# Patient Record
Sex: FEMALE | Race: WHITE | NOT HISPANIC OR LATINO | Employment: FULL TIME | URBAN - METROPOLITAN AREA
[De-identification: names, ages, dates, MRNs, and addresses within clinical notes are randomized per-mention and may not be internally consistent; named-entity substitution may affect disease eponyms.]

---

## 2018-04-30 ENCOUNTER — APPOINTMENT (EMERGENCY)
Dept: RADIOLOGY | Facility: HOSPITAL | Age: 23
DRG: 866 | End: 2018-04-30
Payer: COMMERCIAL

## 2018-04-30 ENCOUNTER — HOSPITAL ENCOUNTER (INPATIENT)
Facility: HOSPITAL | Age: 23
LOS: 2 days | Discharge: HOME | DRG: 866 | End: 2018-05-02
Attending: EMERGENCY MEDICINE | Admitting: FAMILY MEDICINE
Payer: COMMERCIAL

## 2018-04-30 DIAGNOSIS — R79.89 ELEVATED LFTS: ICD-10-CM

## 2018-04-30 DIAGNOSIS — R11.2 NAUSEA & VOMITING: ICD-10-CM

## 2018-04-30 DIAGNOSIS — R16.0 HEPATOMEGALY: ICD-10-CM

## 2018-04-30 DIAGNOSIS — K29.70 GASTRITIS: Primary | ICD-10-CM

## 2018-04-30 DIAGNOSIS — R10.13 ACUTE EPIGASTRIC PAIN: ICD-10-CM

## 2018-04-30 PROBLEM — D72.829 LEUKOCYTOSIS: Status: ACTIVE | Noted: 2018-04-30

## 2018-04-30 PROBLEM — N39.0 UTI (URINARY TRACT INFECTION): Status: ACTIVE | Noted: 2018-04-30

## 2018-04-30 LAB
ALBUMIN SERPL BCP-MCNC: 3.4 G/DL (ref 3.5–5)
ALP SERPL-CCNC: 223 U/L (ref 46–116)
ALT SERPL W P-5'-P-CCNC: 288 U/L (ref 12–78)
AMYLASE SERPL-CCNC: 95 IU/L (ref 25–115)
ANION GAP SERPL CALCULATED.3IONS-SCNC: 10 MMOL/L (ref 4–13)
AST SERPL W P-5'-P-CCNC: 215 U/L (ref 5–45)
BACTERIA UR QL AUTO: ABNORMAL /HPF
BASOPHILS # BLD AUTO: 0 THOUSANDS/ΜL (ref 0–0.1)
BASOPHILS NFR BLD AUTO: 0 % (ref 0–1)
BILIRUB SERPL-MCNC: 1.9 MG/DL (ref 0.2–1)
BILIRUB UR QL STRIP: ABNORMAL
BUN SERPL-MCNC: 9 MG/DL (ref 5–25)
CALCIUM SERPL-MCNC: 9.2 MG/DL (ref 8.3–10.1)
CHLORIDE SERPL-SCNC: 102 MMOL/L (ref 100–108)
CLARITY UR: CLEAR
CO2 SERPL-SCNC: 27 MMOL/L (ref 21–32)
COLOR UR: ABNORMAL
CREAT SERPL-MCNC: 1.07 MG/DL (ref 0.6–1.3)
EOSINOPHIL # BLD AUTO: 0 THOUSAND/ΜL (ref 0–0.61)
EOSINOPHIL NFR BLD AUTO: 0 % (ref 0–6)
ERYTHROCYTE [DISTWIDTH] IN BLOOD BY AUTOMATED COUNT: 14.8 % (ref 11.6–15.1)
EXT PREG TEST URINE: NEGATIVE
GFR SERPL CREATININE-BSD FRML MDRD: 73 ML/MIN/1.73SQ M
GLUCOSE SERPL-MCNC: 94 MG/DL (ref 65–140)
GLUCOSE UR STRIP-MCNC: NEGATIVE MG/DL
HCT VFR BLD AUTO: 39.5 % (ref 37–47)
HGB BLD-MCNC: 12.9 G/DL (ref 12–16)
HGB UR QL STRIP.AUTO: ABNORMAL
KETONES UR STRIP-MCNC: ABNORMAL MG/DL
LEUKOCYTE ESTERASE UR QL STRIP: ABNORMAL
LIPASE SERPL-CCNC: 293 U/L (ref 73–393)
LYMPHOCYTES # BLD AUTO: 7.7 THOUSANDS/ΜL (ref 0.6–4.47)
LYMPHOCYTES NFR BLD AUTO: 69 % (ref 14–44)
MAGNESIUM SERPL-MCNC: 2.1 MG/DL (ref 1.6–2.6)
MCH RBC QN AUTO: 29.2 PG (ref 27–31)
MCHC RBC AUTO-ENTMCNC: 32.6 G/DL (ref 31.4–37.4)
MCV RBC AUTO: 90 FL (ref 82–98)
MONOCYTES # BLD AUTO: 1.3 THOUSAND/ΜL (ref 0.17–1.22)
MONOCYTES NFR BLD AUTO: 11 % (ref 4–12)
MUCOUS THREADS UR QL AUTO: ABNORMAL
NEUTROPHILS # BLD AUTO: 2.2 THOUSANDS/ΜL (ref 1.85–7.62)
NEUTS SEG NFR BLD AUTO: 19 % (ref 43–75)
NITRITE UR QL STRIP: POSITIVE
NON-SQ EPI CELLS URNS QL MICRO: ABNORMAL /HPF
NRBC BLD AUTO-RTO: 0 /100 WBCS
PH UR STRIP.AUTO: 6.5 [PH] (ref 5–9)
PLATELET # BLD AUTO: 197 THOUSANDS/UL (ref 130–400)
PLATELET BLD QL SMEAR: ADEQUATE
PMV BLD AUTO: 8.1 FL (ref 8.9–12.7)
POTASSIUM SERPL-SCNC: 3.9 MMOL/L (ref 3.5–5.3)
PROT SERPL-MCNC: 7.7 G/DL (ref 6.4–8.2)
PROT UR STRIP-MCNC: ABNORMAL MG/DL
RBC # BLD AUTO: 4.41 MILLION/UL (ref 4.2–5.4)
RBC #/AREA URNS AUTO: ABNORMAL /HPF
SMUDGE CELLS BLD QL SMEAR: PRESENT
SODIUM SERPL-SCNC: 139 MMOL/L (ref 136–145)
SP GR UR STRIP.AUTO: 1.02 (ref 1–1.03)
UROBILINOGEN UR QL STRIP.AUTO: 2 E.U./DL
WBC # BLD AUTO: 11.2 THOUSAND/UL (ref 4.8–10.8)
WBC #/AREA URNS AUTO: ABNORMAL /HPF

## 2018-04-30 PROCEDURE — 74177 CT ABD & PELVIS W/CONTRAST: CPT

## 2018-04-30 PROCEDURE — 81025 URINE PREGNANCY TEST: CPT | Performed by: EMERGENCY MEDICINE

## 2018-04-30 PROCEDURE — 81001 URINALYSIS AUTO W/SCOPE: CPT | Performed by: EMERGENCY MEDICINE

## 2018-04-30 PROCEDURE — 36415 COLL VENOUS BLD VENIPUNCTURE: CPT | Performed by: EMERGENCY MEDICINE

## 2018-04-30 PROCEDURE — 82150 ASSAY OF AMYLASE: CPT | Performed by: EMERGENCY MEDICINE

## 2018-04-30 PROCEDURE — 85025 COMPLETE CBC W/AUTO DIFF WBC: CPT | Performed by: EMERGENCY MEDICINE

## 2018-04-30 PROCEDURE — 99285 EMERGENCY DEPT VISIT HI MDM: CPT

## 2018-04-30 PROCEDURE — 87081 CULTURE SCREEN ONLY: CPT | Performed by: NURSE PRACTITIONER

## 2018-04-30 PROCEDURE — 87086 URINE CULTURE/COLONY COUNT: CPT | Performed by: NURSE PRACTITIONER

## 2018-04-30 PROCEDURE — 96361 HYDRATE IV INFUSION ADD-ON: CPT

## 2018-04-30 PROCEDURE — 96375 TX/PRO/DX INJ NEW DRUG ADDON: CPT

## 2018-04-30 PROCEDURE — 99222 1ST HOSP IP/OBS MODERATE 55: CPT | Performed by: NURSE PRACTITIONER

## 2018-04-30 PROCEDURE — 83735 ASSAY OF MAGNESIUM: CPT | Performed by: NURSE PRACTITIONER

## 2018-04-30 PROCEDURE — 96365 THER/PROPH/DIAG IV INF INIT: CPT

## 2018-04-30 PROCEDURE — 83690 ASSAY OF LIPASE: CPT | Performed by: EMERGENCY MEDICINE

## 2018-04-30 PROCEDURE — 80053 COMPREHEN METABOLIC PANEL: CPT | Performed by: EMERGENCY MEDICINE

## 2018-04-30 RX ORDER — ONDANSETRON 2 MG/ML
4 INJECTION INTRAMUSCULAR; INTRAVENOUS ONCE
Status: COMPLETED | OUTPATIENT
Start: 2018-04-30 | End: 2018-04-30

## 2018-04-30 RX ORDER — SODIUM CHLORIDE, SODIUM LACTATE, POTASSIUM CHLORIDE, CALCIUM CHLORIDE 600; 310; 30; 20 MG/100ML; MG/100ML; MG/100ML; MG/100ML
125 INJECTION, SOLUTION INTRAVENOUS CONTINUOUS
Status: DISCONTINUED | OUTPATIENT
Start: 2018-04-30 | End: 2018-05-02 | Stop reason: HOSPADM

## 2018-04-30 RX ORDER — MAGNESIUM HYDROXIDE/ALUMINUM HYDROXICE/SIMETHICONE 120; 1200; 1200 MG/30ML; MG/30ML; MG/30ML
30 SUSPENSION ORAL ONCE
Status: COMPLETED | OUTPATIENT
Start: 2018-04-30 | End: 2018-04-30

## 2018-04-30 RX ORDER — ALPRAZOLAM 0.25 MG/1
0.25 TABLET ORAL
COMMUNITY

## 2018-04-30 RX ORDER — KETOROLAC TROMETHAMINE 30 MG/ML
15 INJECTION, SOLUTION INTRAMUSCULAR; INTRAVENOUS EVERY 6 HOURS PRN
Status: DISCONTINUED | OUTPATIENT
Start: 2018-04-30 | End: 2018-05-01

## 2018-04-30 RX ORDER — NORGESTIMATE AND ETHINYL ESTRADIOL 7DAYSX3 LO
1 KIT ORAL DAILY
COMMUNITY

## 2018-04-30 RX ORDER — PANTOPRAZOLE SODIUM 40 MG/1
40 TABLET, DELAYED RELEASE ORAL
Status: DISCONTINUED | OUTPATIENT
Start: 2018-04-30 | End: 2018-04-30

## 2018-04-30 RX ORDER — NORGESTIMATE AND ETHINYL ESTRADIOL 7DAYSX3 LO
1 KIT ORAL DAILY
Status: DISCONTINUED | OUTPATIENT
Start: 2018-05-01 | End: 2018-05-02 | Stop reason: HOSPADM

## 2018-04-30 RX ORDER — PANTOPRAZOLE SODIUM 40 MG/1
40 TABLET, DELAYED RELEASE ORAL
Status: DISCONTINUED | OUTPATIENT
Start: 2018-05-01 | End: 2018-05-01

## 2018-04-30 RX ORDER — KETOROLAC TROMETHAMINE 30 MG/ML
30 INJECTION, SOLUTION INTRAMUSCULAR; INTRAVENOUS ONCE
Status: COMPLETED | OUTPATIENT
Start: 2018-04-30 | End: 2018-04-30

## 2018-04-30 RX ORDER — BUTALBITAL, ACETAMINOPHEN AND CAFFEINE 50; 325; 40 MG/1; MG/1; MG/1
1 TABLET ORAL EVERY 6 HOURS PRN
Status: DISCONTINUED | OUTPATIENT
Start: 2018-04-30 | End: 2018-04-30

## 2018-04-30 RX ORDER — ALPRAZOLAM 0.25 MG/1
0.25 TABLET ORAL
Status: DISCONTINUED | OUTPATIENT
Start: 2018-04-30 | End: 2018-05-02 | Stop reason: HOSPADM

## 2018-04-30 RX ADMIN — ONDANSETRON 4 MG: 2 INJECTION INTRAMUSCULAR; INTRAVENOUS at 17:04

## 2018-04-30 RX ADMIN — IOHEXOL 100 ML: 350 INJECTION, SOLUTION INTRAVENOUS at 18:38

## 2018-04-30 RX ADMIN — FAMOTIDINE 20 MG: 10 INJECTION, SOLUTION INTRAVENOUS at 17:15

## 2018-04-30 RX ADMIN — SODIUM CHLORIDE 1000 ML: 0.9 INJECTION, SOLUTION INTRAVENOUS at 17:04

## 2018-04-30 RX ADMIN — KETOROLAC TROMETHAMINE 30 MG: 30 INJECTION, SOLUTION INTRAMUSCULAR at 17:14

## 2018-04-30 RX ADMIN — SODIUM CHLORIDE, SODIUM LACTATE, POTASSIUM CHLORIDE, AND CALCIUM CHLORIDE 125 ML/HR: .6; .31; .03; .02 INJECTION, SOLUTION INTRAVENOUS at 22:21

## 2018-04-30 RX ADMIN — ALUMINUM HYDROXIDE, MAGNESIUM HYDROXIDE, AND SIMETHICONE 30 ML: 200; 200; 20 SUSPENSION ORAL at 17:14

## 2018-04-30 RX ADMIN — CEFTRIAXONE 1000 MG: 1 INJECTION, SOLUTION INTRAVENOUS at 18:48

## 2018-04-30 NOTE — ED PROVIDER NOTES
History  Chief Complaint   Patient presents with    Abdominal Pain     had fever on Tuesday and Wednesday started with vomiting and abd pain on Thursday   Saw PMD on Thursday neg flu but treated with Tamaflu  Then on Saturday spoke with  a doctor via phone who then prescribed levaquin and Flagyl     STARTED W/ FVR 5 DAYS AGO, PMD GAVE TAMIFLU, FLU WAS NEG???  THEN PMD GAVE LEVAQN AND FLGYL??  C/O EPIGAST PAIN, N/V  TENDER EPIGAST AREA  History provided by:  Patient  Abdominal Pain   Pain location:  Epigastric  Pain quality: dull and heavy    Pain radiates to:  Does not radiate  Pain severity:  Moderate  Onset quality:  Unable to specify  Duration:  5 days  Timing:  Constant  Progression:  Worsening  Chronicity:  New  Relieved by:  Nothing  Worsened by:  Nothing  Associated symptoms: nausea and vomiting        Prior to Admission Medications   Prescriptions Last Dose Informant Patient Reported? Taking? ALPRAZolam (XANAX) 0 25 mg tablet Past Week at Unknown time  Yes Yes   Sig: Take 0 25 mg by mouth daily at bedtime as needed for anxiety      Facility-Administered Medications: None       Past Medical History:   Diagnosis Date    Anxiety     Migraine        Past Surgical History:   Procedure Laterality Date    KNEE ARTHROSCOPY         History reviewed  No pertinent family history  I have reviewed and agree with the history as documented  Social History   Substance Use Topics    Smoking status: Never Smoker    Smokeless tobacco: Never Used    Alcohol use Yes      Comment: rare        Review of Systems   Gastrointestinal: Positive for abdominal pain, nausea and vomiting  All other systems reviewed and are negative        Physical Exam  ED Triage Vitals [04/30/18 1620]   Temperature Pulse Respirations Blood Pressure SpO2   99 1 °F (37 3 °C) 78 18 104/55 100 %      Temp Source Heart Rate Source Patient Position - Orthostatic VS BP Location FiO2 (%)   Tympanic Monitor Sitting Right arm --      Pain Score       Worst Possible Pain           Orthostatic Vital Signs  Vitals:    04/30/18 1745 04/30/18 1800 04/30/18 1815 04/30/18 1830   BP: 106/57 106/60 100/60 101/67   Pulse: 66 64 60 60   Patient Position - Orthostatic VS:           Physical Exam   Constitutional: She is oriented to person, place, and time  She appears well-developed and well-nourished  No distress  HENT:   Head: Normocephalic and atraumatic  Eyes: Conjunctivae and EOM are normal    Neck: Normal range of motion  Neck supple  Cardiovascular: Normal rate and regular rhythm  Pulmonary/Chest: Effort normal and breath sounds normal  She exhibits no tenderness  Abdominal: Soft  There is tenderness in the epigastric area  Musculoskeletal: Normal range of motion  Neurological: She is alert and oriented to person, place, and time  Skin: Skin is warm and dry  No rash noted  She is not diaphoretic         ED Medications  Medications   cefTRIAXone (ROCEPHIN) IVPB (premix) 1,000 mg (1,000 mg Intravenous New Bag 4/30/18 1848)   ketorolac (TORADOL) injection 30 mg (30 mg Intravenous Given 4/30/18 1714)   aluminum-magnesium hydroxide-simethicone (MYLANTA) 200-200-20 mg/5 mL oral suspension 30 mL (30 mL Oral Given 4/30/18 1714)   famotidine (PEPCID) injection 20 mg (20 mg Intravenous Given 4/30/18 1715)   ondansetron (ZOFRAN) injection 4 mg (4 mg Intravenous Given 4/30/18 1704)   sodium chloride 0 9 % bolus 1,000 mL (1,000 mL Intravenous New Bag 4/30/18 1704)   iohexol (OMNIPAQUE) 350 MG/ML injection (MULTI-DOSE) 100 mL (100 mL Intravenous Given 4/30/18 1838)       Diagnostic Studies  Results Reviewed     Procedure Component Value Units Date/Time    CBC and differential [[id number]]  (Abnormal) Collected:  04/30/18 1703    Lab Status:  Final result Specimen:  Blood from Arm, Right Updated:  04/30/18 1746     WBC 11 20 (H) Thousand/uL      RBC 4 41 Million/uL      Hemoglobin 12 9 g/dL      Hematocrit 39 5 %      MCV 90 fL      MCH 29 2 pg      MCHC 32 6 g/dL      RDW 14 8 %      MPV 8 1 (L) fL      Platelets 467 Thousands/uL      nRBC 0 /100 WBCs      Neutrophils Relative 19 (L) %      Lymphocytes Relative 69 (H) %      Monocytes Relative 11 %      Eosinophils Relative 0 %      Basophils Relative 0 %      Neutrophils Absolute 2 20 Thousands/µL      Lymphocytes Absolute 7 70 (H) Thousands/µL      Monocytes Absolute 1 30 (H) Thousand/µL      Eosinophils Absolute 0 00 Thousand/µL      Basophils Absolute 0 00 Thousands/µL     Urine Microscopic [[id number]]  (Abnormal) Collected:  04/30/18 1712    Lab Status:  Final result Specimen:  Urine from Urine, Clean Catch Updated:  04/30/18 1738     RBC, UA 1-2 (A) /hpf      WBC, UA None Seen /hpf      Epithelial Cells Occasional /hpf      Bacteria, UA Moderate (A) /hpf      MUCOUS THREADS Moderate (A)    Comprehensive metabolic panel [[id number]]  (Abnormal) Collected:  04/30/18 1703    Lab Status:  Final result Specimen:  Blood from Arm, Right Updated:  04/30/18 1735     Sodium 139 mmol/L      Potassium 3 9 mmol/L      Chloride 102 mmol/L      CO2 27 mmol/L      Anion Gap 10 mmol/L      BUN 9 mg/dL      Creatinine 1 07 mg/dL      Glucose 94 mg/dL      Calcium 9 2 mg/dL       (H) U/L       (H) U/L      Alkaline Phosphatase 223 (H) U/L      Total Protein 7 7 g/dL      Albumin 3 4 (L) g/dL      Total Bilirubin 1 90 (H) mg/dL      eGFR 73 ml/min/1 73sq m     Narrative:         National Kidney Disease Education Program recommendations are as follows:  GFR calculation is accurate only with a steady state creatinine  Chronic Kidney disease less than 60 ml/min/1 73 sq  meters  Kidney failure less than 15 ml/min/1 73 sq  meters      Amylase [[id number]]  (Normal) Collected:  04/30/18 1703    Lab Status:  Final result Specimen:  Blood from Arm, Right Updated:  04/30/18 1735     Amylase 95 IU/L     Lipase [[id number]]  (Normal) Collected:  04/30/18 1703    Lab Status:  Final result Specimen:  Blood from Arm, Right Updated: 04/30/18 1735     Lipase 293 u/L     Urinalysis with reflex to microscopic [[id number]]  (Abnormal) Collected:  04/30/18 1712    Lab Status:  Final result Specimen:  Urine from Urine, Clean Catch Updated:  04/30/18 1725     Color, UA Sameera     Clarity, UA Clear     Specific Gravity, UA 1 025     pH, UA 6 5     Leukocytes, UA Trace (A)     Nitrite, UA Positive (A)     Protein, UA 30 (1+) (A) mg/dl      Glucose, UA Negative mg/dl      Ketones, UA 15 (1+) (A) mg/dl      Urobilinogen, UA 2 0 (A) E U /dl      Bilirubin, UA Interference- unable to analyze (A)     Blood, UA Trace-lysed (A)    POCT pregnancy, urine [[id number]]  (Normal) Resulted:  04/30/18 1710    Lab Status:  Final result Updated:  04/30/18 1715     EXT PREG TEST UR (Ref: Negative) negative                 CT abdomen pelvis with contrast   Final Result by Uriah Aviles MD (04/30 1854)      Hepatomegaly  Mild diffuse gastric wall thickening could relate to gastritis or underdistention  Trace bilateral pleural effusions  Workstation performed: [id number]                    Procedures  Procedures       Phone Contacts  ED Phone Contact    ED Course                               MDM  Number of Diagnoses or Management Options  Diagnosis management comments: PT WILL NEED GI CONSULT, HEPATITIS W/UP    CritCare Time    Disposition  Final diagnoses:   Gastritis   Hepatomegaly     Time reflects when diagnosis was documented in both MDM as applicable and the Disposition within this note     Time User Action Codes Description Comment    4/30/2018  7:19 PM Ruddy Elam Add [K29 70] Gastritis     4/30/2018  7:20 PM Ruddy Elam Add [R16 0] Hepatomegaly       ED Disposition     ED Disposition Condition Comment    Admit  Case was discussed with HOSPITALIST and the patient's admission status was agreed to be Admission Status: inpatient status to the service of Dr Alba Capellan           Follow-up Information    None       Patient's Medications   Discharge Prescriptions    No medications on file     No discharge procedures on file      ED Provider  Electronically Signed by           Billie Mckoy MD  04/30/18 9658

## 2018-04-30 NOTE — LETTER
[address] [address] [place] [address]  Dept: [phone]    May 2, 2018     Patient: Cedrick Chavis   YOB: 1995   Date of Visit: 4/30/2018       To Whom it May Concern:    Cedrick Chavis is under my professional care  She was seen in the hospital from 4/30/2018   to 05/02/18  She may return to work on 05/07/18  If you have any questions or concerns, please don't hesitate to call           Sincerely,          Sarwat Winston MD

## 2018-05-01 ENCOUNTER — APPOINTMENT (INPATIENT)
Dept: RADIOLOGY | Facility: HOSPITAL | Age: 23
DRG: 866 | End: 2018-05-01
Payer: COMMERCIAL

## 2018-05-01 LAB
ALBUMIN SERPL BCP-MCNC: 2.8 G/DL (ref 3.5–5)
ALP SERPL-CCNC: 199 U/L (ref 46–116)
ALT SERPL W P-5'-P-CCNC: 246 U/L (ref 12–78)
AMPHETAMINES SERPL QL SCN: NEGATIVE
ANION GAP SERPL CALCULATED.3IONS-SCNC: 13 MMOL/L (ref 4–13)
APAP SERPL-MCNC: <2 UG/ML (ref 10–30)
AST SERPL W P-5'-P-CCNC: 196 U/L (ref 5–45)
BARBITURATES UR QL: POSITIVE
BASOPHILS # BLD AUTO: 0 THOUSANDS/ΜL (ref 0–0.1)
BASOPHILS NFR BLD AUTO: 0 % (ref 0–1)
BENZODIAZ UR QL: NEGATIVE
BILIRUB SERPL-MCNC: 2 MG/DL (ref 0.2–1)
BUN SERPL-MCNC: 11 MG/DL (ref 5–25)
CALCIUM SERPL-MCNC: 8.6 MG/DL (ref 8.3–10.1)
CHLORIDE SERPL-SCNC: 104 MMOL/L (ref 100–108)
CO2 SERPL-SCNC: 22 MMOL/L (ref 21–32)
COCAINE UR QL: NEGATIVE
CREAT SERPL-MCNC: 1.09 MG/DL (ref 0.6–1.3)
EOSINOPHIL # BLD AUTO: 0 THOUSAND/ΜL (ref 0–0.61)
EOSINOPHIL NFR BLD AUTO: 0 % (ref 0–6)
ERYTHROCYTE [DISTWIDTH] IN BLOOD BY AUTOMATED COUNT: 14.6 % (ref 11.6–15.1)
GFR SERPL CREATININE-BSD FRML MDRD: 71 ML/MIN/1.73SQ M
GLUCOSE SERPL-MCNC: 75 MG/DL (ref 65–140)
HAV IGM SER QL: NORMAL
HBV CORE IGM SER QL: NORMAL
HBV SURFACE AG SER QL: NORMAL
HCT VFR BLD AUTO: 36.9 % (ref 37–47)
HCV AB SER QL: NORMAL
HETEROPH AB SER QL: NEGATIVE
HGB BLD-MCNC: 11.9 G/DL (ref 12–16)
LACTATE SERPL-SCNC: 1.1 MMOL/L (ref 0.5–2)
LYMPHOCYTES # BLD AUTO: 7.5 THOUSANDS/ΜL (ref 0.6–4.47)
LYMPHOCYTES NFR BLD AUTO: 74 % (ref 14–44)
MAGNESIUM SERPL-MCNC: 2 MG/DL (ref 1.6–2.6)
MCH RBC QN AUTO: 29 PG (ref 27–31)
MCHC RBC AUTO-ENTMCNC: 32.3 G/DL (ref 31.4–37.4)
MCV RBC AUTO: 90 FL (ref 82–98)
METHADONE UR QL: NEGATIVE
MONOCYTES # BLD AUTO: 0.8 THOUSAND/ΜL (ref 0.17–1.22)
MONOCYTES NFR BLD AUTO: 8 % (ref 4–12)
NEUTROPHILS # BLD AUTO: 1.8 THOUSANDS/ΜL (ref 1.85–7.62)
NEUTS SEG NFR BLD AUTO: 18 % (ref 43–75)
NRBC BLD AUTO-RTO: 0 /100 WBCS
OPIATES UR QL SCN: POSITIVE
PCP UR QL: NEGATIVE
PLATELET # BLD AUTO: 174 THOUSANDS/UL (ref 130–400)
PMV BLD AUTO: 8.6 FL (ref 8.9–12.7)
POTASSIUM SERPL-SCNC: 4 MMOL/L (ref 3.5–5.3)
PROT SERPL-MCNC: 6.6 G/DL (ref 6.4–8.2)
RBC # BLD AUTO: 4.11 MILLION/UL (ref 4.2–5.4)
SALICYLATES SERPL-MCNC: <3 MG/DL (ref 3–20)
SMUDGE CELLS BLD QL SMEAR: PRESENT
SODIUM SERPL-SCNC: 139 MMOL/L (ref 136–145)
THC UR QL: NEGATIVE
WBC # BLD AUTO: 10.1 THOUSAND/UL (ref 4.8–10.8)

## 2018-05-01 PROCEDURE — 83605 ASSAY OF LACTIC ACID: CPT | Performed by: NURSE PRACTITIONER

## 2018-05-01 PROCEDURE — 86308 HETEROPHILE ANTIBODY SCREEN: CPT | Performed by: NURSE PRACTITIONER

## 2018-05-01 PROCEDURE — 80053 COMPREHEN METABOLIC PANEL: CPT | Performed by: NURSE PRACTITIONER

## 2018-05-01 PROCEDURE — 87040 BLOOD CULTURE FOR BACTERIA: CPT | Performed by: NURSE PRACTITIONER

## 2018-05-01 PROCEDURE — 80307 DRUG TEST PRSMV CHEM ANLYZR: CPT | Performed by: NURSE PRACTITIONER

## 2018-05-01 PROCEDURE — 76705 ECHO EXAM OF ABDOMEN: CPT

## 2018-05-01 PROCEDURE — 80074 ACUTE HEPATITIS PANEL: CPT | Performed by: NURSE PRACTITIONER

## 2018-05-01 PROCEDURE — 99254 IP/OBS CNSLTJ NEW/EST MOD 60: CPT | Performed by: INTERNAL MEDICINE

## 2018-05-01 PROCEDURE — G0480 DRUG TEST DEF 1-7 CLASSES: HCPCS | Performed by: NURSE PRACTITIONER

## 2018-05-01 PROCEDURE — 80329 ANALGESICS NON-OPIOID 1 OR 2: CPT | Performed by: NURSE PRACTITIONER

## 2018-05-01 PROCEDURE — 85025 COMPLETE CBC W/AUTO DIFF WBC: CPT | Performed by: NURSE PRACTITIONER

## 2018-05-01 PROCEDURE — 83735 ASSAY OF MAGNESIUM: CPT | Performed by: NURSE PRACTITIONER

## 2018-05-01 PROCEDURE — 99232 SBSQ HOSP IP/OBS MODERATE 35: CPT | Performed by: INTERNAL MEDICINE

## 2018-05-01 RX ORDER — CODEINE/BUTALBITAL/ASA/CAFFEIN 30-50-325
1 CAPSULE ORAL EVERY 6 HOURS PRN
COMMUNITY

## 2018-05-01 RX ORDER — PANTOPRAZOLE SODIUM 40 MG/1
40 TABLET, DELAYED RELEASE ORAL
Status: DISCONTINUED | OUTPATIENT
Start: 2018-05-01 | End: 2018-05-02 | Stop reason: HOSPADM

## 2018-05-01 RX ORDER — ONDANSETRON 2 MG/ML
4 INJECTION INTRAMUSCULAR; INTRAVENOUS EVERY 6 HOURS PRN
Status: DISCONTINUED | OUTPATIENT
Start: 2018-05-01 | End: 2018-05-02

## 2018-05-01 RX ORDER — IBUPROFEN 400 MG/1
400 TABLET ORAL EVERY 6 HOURS PRN
Status: DISCONTINUED | OUTPATIENT
Start: 2018-05-01 | End: 2018-05-02 | Stop reason: HOSPADM

## 2018-05-01 RX ORDER — BUTALBITAL/ASPIRIN/CAFFEINE 50-325-40
1 CAPSULE ORAL EVERY 4 HOURS PRN
Status: ON HOLD | COMMUNITY
End: 2018-05-01 | Stop reason: CLARIF

## 2018-05-01 RX ADMIN — SODIUM CHLORIDE, SODIUM LACTATE, POTASSIUM CHLORIDE, AND CALCIUM CHLORIDE 125 ML/HR: .6; .31; .03; .02 INJECTION, SOLUTION INTRAVENOUS at 05:27

## 2018-05-01 RX ADMIN — SODIUM CHLORIDE, SODIUM LACTATE, POTASSIUM CHLORIDE, AND CALCIUM CHLORIDE 125 ML/HR: .6; .31; .03; .02 INJECTION, SOLUTION INTRAVENOUS at 22:07

## 2018-05-01 RX ADMIN — PANTOPRAZOLE SODIUM 40 MG: 40 TABLET, DELAYED RELEASE ORAL at 05:27

## 2018-05-01 RX ADMIN — CEFTRIAXONE 1000 MG: 1 INJECTION, SOLUTION INTRAVENOUS at 19:15

## 2018-05-01 RX ADMIN — ONDANSETRON 4 MG: 2 INJECTION INTRAMUSCULAR; INTRAVENOUS at 22:17

## 2018-05-01 RX ADMIN — SODIUM CHLORIDE, SODIUM LACTATE, POTASSIUM CHLORIDE, AND CALCIUM CHLORIDE 125 ML/HR: .6; .31; .03; .02 INJECTION, SOLUTION INTRAVENOUS at 13:30

## 2018-05-01 RX ADMIN — IBUPROFEN 400 MG: 400 TABLET, FILM COATED ORAL at 22:08

## 2018-05-01 RX ADMIN — PANTOPRAZOLE SODIUM 40 MG: 40 TABLET, DELAYED RELEASE ORAL at 17:21

## 2018-05-01 NOTE — ASSESSMENT & PLAN NOTE
· Mild  WBC 11 2  Patient does not meet sepsis criteria  · Received iv Rocephin for UTI in ED  Will continue    Will repeat lab in AM

## 2018-05-01 NOTE — ASSESSMENT & PLAN NOTE
Patient also noted to have elevated transaminases, alkaline phosphatase and bilirubin  Right upper quadrant ultrasound showed thickened gallbladder wall without any evidence of cholecystitis  Cannot rule out viral etiology  Hepatitis panel was negative  Monitor LFTs in a m

## 2018-05-01 NOTE — ASSESSMENT & PLAN NOTE
· Patient takes Fioricet with codeine p r n  at home  Will hold due to elevated LFTs  · Toradol p r n  Xiomara Ped

## 2018-05-01 NOTE — ASSESSMENT & PLAN NOTE
· Persistent elevated AST ALT, alkaline phosphatase and bilirubin-cannot rule out viral etiology  · CT abdomen pelvis showed hepatomegaly, no gallstones or CBD stones    · Under sound showed thickened gallbladder wall without any evidence of cholecystitis

## 2018-05-01 NOTE — CONSULTS
Consultation - [facility]  Fannie Wilkins 24 y o  female MRN: [id number]  Unit/Bed#: [address] [place] [address] Encounter: [id number]        Inpatient consult to gastroenterology  Consult performed by: Kathie Mckinley ordered by: Sharmila Parker          Reason for Consult / Principal Problem: nausea, vomiting, elevated LFTs, epigastric pain    ASSESSMENT and PLAN:    Principal Problem:    Acute epigastric pain  Active Problems:    Hepatomegaly    UTI (urinary tract infection)    Elevated LFTs    Leukocytosis    Nausea & vomiting    Migraine    Anxiety    #1  Nausea, vomiting, and epigastric pain: started after taking tamiflu for flu like symptoms  Likely related to antibiotic use, underlying viral illness, and gastritis  PUD also on differential although less likely due to lack of NSAID use  No vomiting since yesterdat  -Advance diet to clears  Advacne from there as tolerated  -Antiemetics as needed  -Increase Protonix to BID dosing  -Cannot start carafate as she has cross reactivity to trazadone which causes her severe itching    -Would only recommend endoscopy in this patient if she had persistent symptoms despite supportive care and medications, likely as an outpatient if needed  -no antibiotics recommended at this time for her GI issues  It is noted that she is on Rocephin currently for an underlying UTI  #2  Elevated LFTs: unknown cause, possibly viral related  AST, ALT and alk phos improved today, T bili slightly worsened  , , alk-phos 199, T bili 2 0  Mono test is negative  Tamiflu does not typically cause elevated LFTs  Ultrasound is showing a possible sign of hepatitis due to decreased echogenicity of the liver related to the kidney  No signs of acute cholecystitis  Tylenol level normal  -follow-up hepatitis panel  -continue to monitor closely    Would only consider further workup if LFTs are not improving     -------------------------------------------------------------------------------------------------------------------    HPI:  This is a 48-year-old female with a history of anxiety and migraines who presented to the emergency room due to worsening nausea, vomiting, and epigastric pain  The patient reports that she started with flu-like symptoms, fever, chills, body aches last week  Her primary care physician started her on Tamiflu at that time  After taking the 1st dose of Tamiflu she began to have nausea and vomiting  She ultimately tested negative for the flu  Her family doctor then discontinue the Tamiflu and started her on Levaquin and Flagyl for the vomiting  The patient reports that the vomiting continued to progress  She has not vomited since yesterday at this time  She reports that she has some epigastric tenderness  She denies any NSAID use  She reports taking 1 dose of Tylenol extra-strength the other day  She also takes Fiorinal with codeine for migraines which she reports she took twice over the last week  She also takes Xanax as well for anxiety but has not taken that excessively recently  She denies any IV drug use or other herbal medications  She denies any personal or family history of GI issues or liver issues  She denies any excessive alcohol use  She has never had an upper endoscopy in the past   She denies any significant acid reflux symptoms  REVIEW OF SYSTEMS:    CONSTITUTIONAL: Denies any fever, chills, or rigors  Decreased appetite, and no recent weight loss  HEENT: No earache or tinnitus  Denies hearing loss or visual disturbances  CARDIOVASCULAR: No chest pain or palpitations  RESPIRATORY: Denies any cough, hemoptysis, shortness of breath or dyspnea on exertion  GASTROINTESTINAL: As noted in the History of Present Illness  GENITOURINARY:  +urinary urgency, Denies any hematuria or dysuria    NEUROLOGIC: No dizziness or vertigo, +migraine  MUSCULOSKELETAL: Denies any muscle or joint pain  SKIN: Denies skin rashes or itching  ENDOCRINE: Denies excessive thirst  Denies intolerance to heat or cold  PSYCHOSOCIAL: Denies depression or anxiety  Denies any recent memory loss  Historical Information   Past Medical History:   Diagnosis Date    Anxiety     Migraine      Past Surgical History:   Procedure Laterality Date    FRACTURE SURGERY      right ankle     KNEE ARTHROSCOPY      WISDOM TOOTH EXTRACTION Bilateral     uooers and lowers      Social History   History   Alcohol Use    Yes     Comment: rare     History   Drug Use No     History   Smoking Status    Never Smoker   Smokeless Tobacco    Never Used     History reviewed  No pertinent family history  Meds/Allergies     Prescriptions Prior to Admission   Medication    ALPRAZolam (XANAX) 0 25 mg tablet    butalbital-aspirin-caffeine-codeine (FIORINAL WITH CODEINE) -00-30 mg per capsule    norgestimate-ethinyl estradiol (ORTHO TRI-CYCLEN LO) 0 18/0 215/0 25 MG-25 MCG per tablet     Current Facility-Administered Medications   Medication Dose Route Frequency    ALPRAZolam (XANAX) tablet 0 25 mg  0 25 mg Oral HS PRN    cefTRIAXone (ROCEPHIN) IVPB (premix) 1,000 mg  1,000 mg Intravenous Q24H    ketorolac (TORADOL) injection 15 mg  15 mg Intravenous Q6H PRN    lactated ringers infusion  125 mL/hr Intravenous Continuous    norgestimate-ethinyl estradiol (ORTHO TRI-CYCLEN LO) 0 18/0 215/0 25 MG-25 MCG per tablet 1 tablet  1 tablet Oral Daily    pantoprazole (PROTONIX) EC tablet 40 mg  40 mg Oral BID AC       Allergies   Allergen Reactions    Trazamine [Trazodone & Diet Manage Prod]            Objective     Blood pressure 95/54, pulse 57, temperature 99 6 °F (37 6 °C), temperature source Tympanic, resp  rate 18, height 5' 3" (1 6 m), weight 55 3 kg (122 lb), last menstrual period 04/28/2018, SpO2 97 %, not currently breastfeeding        Intake/Output Summary (Last 24 hours) at 05/01/18 1120  Last data filed at 05/01/18 0601   Gross per 24 hour   Intake          2008  33 ml   Output                0 ml   Net          2008  33 ml         PHYSICAL EXAM:      General Appearance:   Alert, cooperative, no distress, appears stated age    HEENT:   Normocephalic, atraumatic, anicteric, no oropharyngeal thrush present      Neck:  Supple, symmetrical, trachea midline, no adenopathy;    thyroid: no enlargement/tenderness/nodules; no carotid  bruit or JVD    Lungs:   Clear to auscultation bilaterally; no rales, rhonchi or wheezing; respirations unlabored    Heart[de-identified]   S1 and S2 normal; regular rate and rhythm; no murmur, rub, or gallop  Abdomen:   Soft, epigastric tenderness to palpation, non-distended; normal bowel sounds; no masses, no organomegaly    Genitalia:   Deferred    Rectal:   Deferred    Extremities:  No cyanosis, clubbing or edema    Pulses:  2+ and symmetric all extremities    Skin:  Skin color, texture, turgor normal, no rashes or lesions    Lymph nodes:  No palpable cervical, axillary or inguinal lymphadenopathy        Lab Results:     Results from last 7 days  Lab Units 05/01/18  0612   WBC Thousand/uL 10 10   HEMOGLOBIN g/dL 11 9*   HEMATOCRIT % 36 9*   PLATELETS Thousands/uL 174   NEUTROS PCT % 18*   LYMPHS PCT % 74*   MONOS PCT % 8   EOS PCT % 0       Results from last 7 days  Lab Units 05/01/18  0612   SODIUM mmol/L 139   POTASSIUM mmol/L 4 0   CHLORIDE mmol/L 104   CO2 mmol/L 22   BUN mg/dL 11   CREATININE mg/dL 1 09   CALCIUM mg/dL 8 6   TOTAL PROTEIN g/dL 6 6   BILIRUBIN TOTAL mg/dL 2 00*   ALK PHOS U/L 199*   ALT U/L 246*   AST U/L 196*   GLUCOSE RANDOM mg/dL 75           Results from last 7 days  Lab Units 04/30/18  1703   LIPASE u/L 293   AMYLASE IU/L 95       Imaging Studies: I have personally reviewed pertinent imaging studies  Us Liver    Result Date: 5/1/2018  Impression: 1  Mild gallbladder wall thickening though this is likely secondary to underdistention    No stones or pericholecystic fluid identified  2   Relatively decreased echogenicity of the liver relative to the right kidney  Given normal renal function and elevated LFTs, this can be seen with hepatitis  Workstation performed: [id number]     Ct Abdomen Pelvis With Contrast    Result Date: 4/30/2018  Impression: Hepatomegaly  Mild diffuse gastric wall thickening could relate to gastritis or underdistention  Trace bilateral pleural effusions  Workstation performed: [id number]           Patient was seen and examined by Dr Sung Buck  All denise medical decisions were made by Dr Sung Buck  Thank you for allowing us to participate in the care of this present patient  We will follow-up with you closely

## 2018-05-01 NOTE — ASSESSMENT & PLAN NOTE
· CT abdomen pelvis with IV contrast showed mild diffuse gastric wall thickening could relate to gastritis or underdistention  Amylase and lipase normal   · Was on Levaquin/Flagyl p o  since Saturday for epigastric pain/nausea/vomiting  · NPO  IV fluids  Pain control  Protonix p o  Rosella Goldmann Hold off antibiotics  · Consult GI in a m Rosella Goldmann

## 2018-05-01 NOTE — ASSESSMENT & PLAN NOTE
· Multifactorial   Secondary to UTI, gastritis, 2/2 elevated liver enzymes  · CT abdomen pelvis showed hepatomegaly, mild diffuse gastric wall thickening could relate to gastritis or underdistention  · UA+ ketones  Will check Lactic acid  · NPO  Antiemetic p r n  IV fluids

## 2018-05-01 NOTE — ASSESSMENT & PLAN NOTE
· Patient takes Fioricet with codeine p r n  at home  Will hold due to elevated LFTs  · Toradol p r n  Venice Segundo

## 2018-05-01 NOTE — CASE MANAGEMENT
Continued Stay Review    Date: 5/1/18    Vital Signs: BP 95/54 (BP Location: Left arm)   Pulse 57   Temp 99 6 °F (37 6 °C) (Tympanic)   Resp 18   Ht 5' 3" (1 6 m)   Wt 55 3 kg (122 lb)   LMP 04/28/2018   SpO2 97%   Breastfeeding? No   BMI 21 61 kg/m²     Patient has a temp of 101 2 F, hospitalist notified, orders received  ACUTE LOC  CBC DIFF CMP   BLOOD CX   CONSULT GI  RAPID DRUG SCREEN   MONO SCREEN  Medications:   Scheduled Meds:   Current Facility-Administered Medications:  ALPRAZolam 0 25 mg Oral HS PRN KIM Mares    cefTRIAXone 1,000 mg Intravenous Q24H Ruddy Elam MD Last Rate: Stopped (04/30/18 1941)   ketorolac 15 mg Intravenous Q6H PRN KIM Mares    lactated ringers 125 mL/hr Intravenous Continuous IKM Mares Last Rate: 125 mL/hr (05/01/18 0527)   norgestimate-ethinyl estradiol 1 tablet Oral Daily KIM Mares    pantoprazole 40 mg Oral Early Morning KIM Mares      Continuous Infusions:   lactated ringers 125 mL/hr Last Rate: 125 mL/hr (05/01/18 0527)     PRN Meds:   ALPRAZolam    ketorolac    Abnormal Labs/Diagnostic Results:   Barbiturate Ur Negative Positive   A      Opiate Urine Negative Positive   A      RBC 4 20 - 5 40 Million/uL 4 11   L    Hemoglobin 12 0 - 16 0 g/dL 11 9   L    Hematocrit 37 0 - 47 0 % 36 9   L      AST 5 - 45 U/L 196   H      ALT 12 - 78 U/L 246   H      Alkaline Phosphatase 46 - 116 U/L 199   H      Albumin 3 5 - 5 0 g/dL 2 8   L    Total Bilirubin 0 20 - 1 00 mg/dL 2 85   H      Salicylate Lvl <3   L  3 - 20 mg/dL Final     Acetaminophen Level <2   L  10 - 30 ug/mL Final       Age/Sex: 24 y o  female     GI CONSULT     #1  Nausea, vomiting, and epigastric pain: started after taking tamiflu for flu like symptoms  Likely related to antibiotic use, underlying viral illness, and gastritis  PUD also on differential although less likely due to lack of NSAID use  No vomiting since yesterdat  -Advance diet to clears   Marixa Brick from there as tolerated  -Antiemetics as needed  -Increase Protonix to BID dosing  -Cannot start carafate as she has cross reactivity to trazadone which causes her severe itching    -Would only recommend endoscopy in this patient if she had persistent symptoms despite supportive care and medications, likely as an outpatient if needed  -no antibiotics recommended at this time for her GI issues  It is noted that she is on Rocephin currently for an underlying UTI      #2  Elevated LFTs: unknown cause, possibly viral related  AST, ALT and alk phos improved today, T bili slightly worsened  , , alk-phos 199, T bili 2 0  Mono test is negative  Tamiflu does not typically cause elevated LFTs  Ultrasound is showing a possible sign of hepatitis due to decreased echogenicity of the liver related to the kidney  No signs of acute cholecystitis  Tylenol level normal  -follow-up hepatitis panel  -continue to monitor closely  Would only consider further workup if LFTs are not improving  ATTENDING  Acute epigastric pain   Assessment & Plan     · CT abdomen pelvis with IV contrast showed mild diffuse gastric wall thickening could relate to gastritis or underdistention  Amylase and lipase normal   · Patient will be started on Protonix 40 milligram p o  b i d   · Patient could not get Carafate due to interaction with trazodone  · No plan for endoscopy unless refractory symptoms  · Patient started on clear liquid diet which can be advanced as tolerated         Hepatomegaly   Assessment & Plan     Patient also noted to have elevated transaminases, alkaline phosphatase and bilirubin  Right upper quadrant ultrasound showed thickened gallbladder wall without any evidence of cholecystitis  Cannot rule out viral etiology  Hepatitis panel was negative  Monitor LFTs in a m       UTI (urinary tract infection)   Assessment & Plan     · Patient is on IV Rocephin  · Follow up final urine culture     Elevated LFTs   Assessment & Plan     · Persistent elevated AST ALT, alkaline phosphatase and bilirubin-cannot rule out viral etiology     Nausea & vomiting   Assessment & Plan     Likely secondary to gastritis  Continue Protonix and clear liquid diet   Certification Statement: The patient will continue to require additional inpatient hospital stay due to Abdominal pain, elevated LFTs

## 2018-05-01 NOTE — PLAN OF CARE
GASTROINTESTINAL - ADULT     Minimal or absence of nausea and/or vomiting Progressing     Maintains adequate nutritional intake Progressing        INFECTION - ADULT     Absence or prevention of progression during hospitalization Progressing        PAIN - ADULT     Verbalizes/displays adequate comfort level or baseline comfort level Progressing

## 2018-05-01 NOTE — ASSESSMENT & PLAN NOTE
· CT abdomen pelvis with IV contrast showed mild diffuse gastric wall thickening could relate to gastritis or underdistention    Amylase and lipase normal   · Patient will be started on Protonix 40 milligram p o  b i d   · Patient could not get Carafate due to interaction with trazodone  · No plan for endoscopy unless refractory symptoms  · Patient started on clear liquid diet which can be advanced as tolerated

## 2018-05-01 NOTE — H&P
H&P- Suhas Slight 1995, 24 y o  female MRN: [id number]    Unit/Bed#: [address] [place] [address] Encounter: [id number]    Primary Care Provider: Dee Bentley MD   Date and time admitted to hospital: 4/30/2018  4:27 PM        * Acute epigastric pain   Assessment & Plan    · CT abdomen pelvis with IV contrast showed mild diffuse gastric wall thickening could relate to gastritis or underdistention  Amylase and lipase normal   · Was on Levaquin/Flagyl p o  since Saturday for epigastric pain/nausea/vomiting  · NPO  IV fluids  Pain control  Protonix p o  Lynita Ped Hold off antibiotics  · Consult GI in a m               Nausea & vomiting   Assessment & Plan    · Multifactorial   Secondary to UTI, gastritis, 2/2 elevated liver enzymes  · CT abdomen pelvis showed hepatomegaly, mild diffuse gastric wall thickening could relate to gastritis or underdistention  · UA+ ketones  Will check Lactic acid  · NPO  Antiemetic p r n  IV fluids  Elevated LFTs   Assessment & Plan    · , , alk-phos 223, total bilirubin 1  9  Unknown clear etiology  Took 4 doses of Tamiflu 75mg before GI symptoms started  ? Side effect of Tamilfu  Patient took tylenol and migraine medication at home in past 5 days as well  · CT abdomen pelvis showed hepatomegaly, no gallstones or CBD stones  · Liver ultrasound in a m  Lynita Ped Check acute hepatitis panel  Check Tylenol and salicylate level stat  Will check mononucleosis per patient request   · NPO  · Consult GI  Hepatomegaly   Assessment & Plan    For liver ultrasound in a   UTI (urinary tract infection)   Assessment & Plan    · IV Rocephin  · Will follow urine culture  · Kidneys/ureters unremarkable on CT scan  Leukocytosis   Assessment & Plan    · Mild  WBC 11 2  Patient does not meet sepsis criteria  · Received iv Rocephin for UTI in ED  Will continue    Will repeat lab in AM         Migraine   Assessment & Plan    · Patient takes Fioricet with codeine p r n  at home   Will hold due to elevated LFTs  · Toradol p r n             Anxiety   Assessment & Plan    Continue Xanax p r n  Cesar Wilson VTE Prophylaxis: low risk   / sequential compression device   Code Status: Full code  POLST: POLST form is not discussed and not completed at this time  Anticipated Length of Stay:  Patient will be admitted on an Inpatient basis with an anticipated length of stay of  > 2 midnights  Justification for Hospital Stay:  Acute epigastric pain, nauseous, vomiting, elevated LFTs, hepatomegaly, UTI  Total Time for Visit, including Counseling / Coordination of Care: 45 minutes  Greater than 50% of this total time spent on direct patient counseling and coordination of care  Chief Complaint:   Abdominal pain, nauseous vomiting since Thursday last week  Fever on Tuesday and Wednesday last week  History of Present Illness:    Kelsey Hanna is a 24 y o  female with PMH of anxiety and migraine who presents with abdominal pain, nauseous vomiting since Thursday last week  She states she had fever and chills on Tuesday and Wednesday last week  Tmax 102  1  Fever broke since Thursday last week  She saw her PCP on Thursday  Had negative flu swab but was prescribed Tamiflu  She developed abdominal pain, nauseous vomiting a couple hours after taking Tamiflu on Thursday  She called her PCP on Saturday  Was told to stop taking Tamiflu and prescribed Levaquin/ Flagyl on Saturday by an on call physician  She reports abdominal pain is located in the mid upper and left upper quadrant, nonradiating, constant, felt bloated, worse after throwing up,5/10 currently  She reports vomiting a couple of times per day since Thursday with stomach content  She reports taking Tylenol and migraine medications at home in past 5 days  She reports unable to tolerate solid food in past 5 days, but was keeping herself hydrated with liquids  She reports lightheadedness for past 2 days    That is why she came to ED today   She denies chest pains, SOB, diarrhea  She denies runny nose, sore throat, cough  She reports urinary urgency and hesitancy for past couple of days  She reports headache  In ED, blood work showed elevated LFTs  UA was positive for UTI  CT abdomen pelvis showed possible gastritis, hepatomegaly, trace bilateral pleural effusions  Patient received Maalox, IV Rocephin, Pepcid IV, Toradol, Zofran IV, normal saline 1000 mL in ED  Review of Systems:    Review of Systems   Constitutional: Positive for appetite change, chills and fever  Gastrointestinal: Positive for abdominal distention, abdominal pain, nausea and vomiting  Genitourinary: Positive for urgency  Hesitancy  Neurological: Positive for light-headedness and headaches  All other systems reviewed and are negative  Past Medical and Surgical History:     Past Medical History:   Diagnosis Date    Anxiety     Migraine        Past Surgical History:   Procedure Laterality Date    FRACTURE SURGERY      right ankle     KNEE ARTHROSCOPY      WISDOM TOOTH EXTRACTION Bilateral     uooers and lowers        Meds/Allergies:    Prior to Admission medications    Medication Sig Start Date End Date Taking? Authorizing Provider   ALPRAZolam Frank Linger) 0 25 mg tablet Take 0 25 mg by mouth daily at bedtime as needed for anxiety   Yes Historical Provider, MD   norgestimate-ethinyl estradiol (ORTHO TRI-CYCLEN LO) 0 18/0 215/0 25 MG-25 MCG per tablet Take 1 tablet by mouth daily   Yes Historical Provider, MD     I have reviewed home medications with patient personally  Allergies: Allergies   Allergen Reactions    Trazamine [Trazodone & Diet Manage Prod]        Social History:     Marital Status: /Civil Union   Occupation:  Medical records  Patient Pre-hospital Living Situation:  Lives with mother  Patient Pre-hospital Level of Mobility:  Independent  Patient Pre-hospital Diet Restrictions:  None    Substance Use History:   History Alcohol Use    Yes     Comment: rare     History   Smoking Status    Never Smoker   Smokeless Tobacco    Never Used     History   Drug Use No       Family History:    History reviewed  No pertinent family history  Physical Exam:     Vitals:   Blood Pressure: 114/63 (04/30/18 2005)  Pulse: 66 (04/30/18 2005)  Temperature: 97 8 °F (36 6 °C) (04/30/18 2005)  Temp Source: Oral (04/30/18 2005)  Respirations: 18 (04/30/18 2005)  Height: 5' 3" (160 cm) (04/30/18 2005)  Weight - Scale: 55 3 kg (122 lb) (04/30/18 2005)  SpO2: 98 % (04/30/18 2005)    Physical Exam   Constitutional: She is oriented to person, place, and time  She appears well-developed  Patient appears dehydrated  HENT:   Head: Normocephalic and atraumatic  Neck: Normal range of motion  Neck supple  Cardiovascular: Normal rate and regular rhythm  Exam reveals no gallop and no friction rub  No murmur heard  Pulmonary/Chest: Effort normal and breath sounds normal  No respiratory distress  She has no wheezes  She has no rales  Abdominal: Soft  Bowel sounds are normal  She exhibits distension  There is tenderness  There is no rebound and no guarding  Epigastric and left upper quadrant tender to touch  Musculoskeletal: Normal range of motion  She exhibits no edema, tenderness or deformity  Neurological: She is alert and oriented to person, place, and time  Skin: Skin is warm and dry  Psychiatric: She has a normal mood and affect  Nursing note and vitals reviewed  Additional Data:     Lab Results: I have personally reviewed pertinent reports          Results from last 7 days  Lab Units 04/30/18  1703   WBC Thousand/uL 11 20*   HEMOGLOBIN g/dL 12 9   HEMATOCRIT % 39 5   PLATELETS Thousands/uL 197   NEUTROS PCT % 19*   LYMPHS PCT % 69*   MONOS PCT % 11   EOS PCT % 0       Results from last 7 days  Lab Units 04/30/18  1703   SODIUM mmol/L 139   POTASSIUM mmol/L 3 9   CHLORIDE mmol/L 102   CO2 mmol/L 27   BUN mg/dL 9   CREATININE mg/dL 1 07   CALCIUM mg/dL 9 2   TOTAL PROTEIN g/dL 7 7   BILIRUBIN TOTAL mg/dL 1 90*   ALK PHOS U/L 223*   ALT U/L 288*   AST U/L 215*   GLUCOSE RANDOM mg/dL 94           Imaging: I have personally reviewed pertinent reports  Ct Abdomen Pelvis With Contrast    Result Date: 4/30/2018  Narrative: CT ABDOMEN AND PELVIS WITH IV CONTRAST INDICATION:   Abdominal pain, unspecified ELEVATED LFT's  COMPARISON: None  TECHNIQUE:  CT examination of the abdomen and pelvis was performed  Axial, sagittal, and coronal 2D reformatted images were created from the source data and submitted for interpretation  Radiation dose length product (DLP) for this visit:  415 28 mGy-cm   This examination, like all CT scans performed in the [facility], was performed utilizing techniques to minimize radiation dose exposure, including the use of iterative  reconstruction and automated exposure control  IV Contrast:  100 mL of iohexol (OMNIPAQUE) Enteric Contrast:  Enteric contrast was not administered  FINDINGS: ABDOMEN LOWER CHEST:  Trace bilateral pleural effusions  LIVER/BILIARY TREE:  Hepatomegaly  GALLBLADDER:  No calcified gallstones  No pericholecystic inflammatory change  SPLEEN:  Unremarkable  PANCREAS:  Unremarkable  ADRENAL GLANDS:  Unremarkable  KIDNEYS/URETERS:  Unremarkable  No hydronephrosis  STOMACH AND BOWEL:  Mild diffuse gastric wall thickening could relate to gastritis or underdistention  APPENDIX:  No findings to suggest appendicitis  ABDOMINOPELVIC CAVITY:  There is a small volume of free pelvic fluid, likely physiologic given the patient's age and gender  No free intraperitoneal air  No lymphadenopathy  VESSELS:  Unremarkable for patient's age  PELVIS REPRODUCTIVE ORGANS:  Unremarkable for patient's age  URINARY BLADDER:  Unremarkable  ABDOMINAL WALL/INGUINAL REGIONS:  Unremarkable  OSSEOUS STRUCTURES:  No acute fracture or destructive osseous lesion  Impression: Hepatomegaly   Mild diffuse gastric wall thickening could relate to gastritis or underdistention  Trace bilateral pleural effusions  Workstation performed: [id number]       EKG, Pathology, and Other Studies Reviewed on Admission:   · EKG: n/a    [employer] Records Reviewed: No n/a     ** Please Note: Dragon 360 Dictation voice to text software may have been used in the creation of this document   **

## 2018-05-01 NOTE — SOCIAL WORK
DASH discussion completed  Discussed goals of making sure pt's needs are met upon discharge, pt's preferences are taken into account, pt understands her health condition, medications and symptoms to watch for after returning home and pt is aware of any follow up appointments recommended by hospital physician  Spoke with the pt at the bedside  She is independent with her own care  She has no DME or HHC    She does drive and usesd the [employer] in [place], [place]

## 2018-05-01 NOTE — ASSESSMENT & PLAN NOTE
· , , alk-phos 223, total bilirubin 1  9  Unknown clear etiology  Took 4 doses of Tamiflu 75mg before GI symptoms started  ? Side effect of Tamilfu  Patient took tylenol and migraine medication at home in past 5 days as well  · CT abdomen pelvis showed hepatomegaly, no gallstones or CBD stones  · Liver ultrasound in a [facility] Check acute hepatitis panel  Check Tylenol and salicylate level stat  Will check mononucleosis per patient request   · NPO  · Consult GI

## 2018-05-01 NOTE — PROGRESS NOTES
Progress Note - Kelsey Hanna 1995, 24 y o  female MRN: [id number]    Unit/Bed#: [address] [place] [address] Encounter: [id number]    Primary Care Provider: Leonila Garcia MD   Date and time admitted to hospital: 4/30/2018  4:27 PM        * Acute epigastric pain   Assessment & Plan    · CT abdomen pelvis with IV contrast showed mild diffuse gastric wall thickening could relate to gastritis or underdistention  Amylase and lipase normal   · Patient will be started on Protonix 40 milligram p o  b i d   · Patient could not get Carafate due to interaction with trazodone  · No plan for endoscopy unless refractory symptoms  · Patient started on clear liquid diet which can be advanced as tolerated              Hepatomegaly   Assessment & Plan    Patient also noted to have elevated transaminases, alkaline phosphatase and bilirubin  Right upper quadrant ultrasound showed thickened gallbladder wall without any evidence of cholecystitis  Cannot rule out viral etiology  Hepatitis panel was negative  Monitor LFTs in a m         UTI (urinary tract infection)   Assessment & Plan    · Patient is on IV Rocephin  · Follow up final urine culture        Elevated LFTs   Assessment & Plan    · Persistent elevated AST ALT, alkaline phosphatase and bilirubin-cannot rule out viral etiology  · CT abdomen pelvis showed hepatomegaly, no gallstones or CBD stones  · Under sound showed thickened gallbladder wall without any evidence of cholecystitis        Leukocytosis   Assessment & Plan    · Improved on IV Rocephin        Nausea & vomiting   Assessment & Plan    Likely secondary to gastritis  Continue Protonix and clear liquid diet          Migraine   Assessment & Plan    · Patient takes Fioricet with codeine p r n  at home  Will hold due to elevated LFTs  · Toradol p r n  Cesar Wilson               VTE Pharmacologic Prophylaxis:   Pharmacologic: Low risk  Mechanical VTE Prophylaxis in Place: Yes    Patient Centered Rounds: I have performed bedside rounds with nursing staff today  Discussions with Specialists or Other Care Team Provider: Magui Dickson    Education and Discussions with Family / Patient: Yes    Time Spent for Care: 30 minutes  More than 50% of total time spent on counseling and coordination of care as described above  Current Length of Stay: 1 day(s)    Current Patient Status: Inpatient   Certification Statement: The patient will continue to require additional inpatient hospital stay due to Abdominal pain, elevated LFTs    Discharge Plan: Home    Code Status: Level 1 - Full Code      Subjective:   Patient is still complaining of some epigastric abdominal pain  Patient denies any further nausea, vomiting or diarrhea  Objective:     Vitals:   Temp (24hrs), Av 6 °F (37 °C), Min:97 8 °F (36 6 °C), Max:99 6 °F (37 6 °C)    HR:  [57-73] 73  Resp:  [18] 18  BP: ()/(54-67) 102/61  SpO2:  [97 %-99 %] 99 %  Body mass index is 21 61 kg/m²  Input and Output Summary (last 24 hours): Intake/Output Summary (Last 24 hours) at 18 1828  Last data filed at 18 0601   Gross per 24 hour   Intake            33 ml   Output                0 ml   Net            33 ml       Physical Exam:     Physical Exam   Constitutional: No distress  HENT:   Head: Normocephalic and atraumatic  Nose: Nose normal    Eyes: Conjunctivae and EOM are normal  Pupils are equal, round, and reactive to light  Neck: Normal range of motion  Neck supple  No JVD present  Cardiovascular: Normal rate, regular rhythm and normal heart sounds  Exam reveals no gallop and no friction rub  No murmur heard  Pulmonary/Chest: Effort normal and breath sounds normal  No respiratory distress  She has no wheezes  She has no rales  She exhibits no tenderness  Abdominal: Soft  Bowel sounds are normal  She exhibits no distension  There is tenderness  There is no rebound and no guarding  Mild epigastric tenderness   Musculoskeletal: She exhibits no edema  Neurological: She is alert  No cranial nerve deficit  Skin: Skin is warm and dry  No rash noted  Psychiatric: She has a normal mood and affect  Additional Data:     Labs:      Results from last 7 days  Lab Units 05/01/18  0612   WBC Thousand/uL 10 10   HEMOGLOBIN g/dL 11 9*   HEMATOCRIT % 36 9*   PLATELETS Thousands/uL 174   NEUTROS PCT % 18*   LYMPHS PCT % 74*   MONOS PCT % 8   EOS PCT % 0       Results from last 7 days  Lab Units 05/01/18  0612   SODIUM mmol/L 139   POTASSIUM mmol/L 4 0   CHLORIDE mmol/L 104   CO2 mmol/L 22   BUN mg/dL 11   CREATININE mg/dL 1 09   CALCIUM mg/dL 8 6   TOTAL PROTEIN g/dL 6 6   BILIRUBIN TOTAL mg/dL 2 00*   ALK PHOS U/L 199*   ALT U/L 246*   AST U/L 196*   GLUCOSE RANDOM mg/dL 75           * I Have Reviewed All Lab Data Listed Above  * Additional Pertinent Lab Tests Reviewed: [facility]        Recent Cultures (last 7 days):           Last 24 Hours Medication List:     Current Facility-Administered Medications:  ALPRAZolam 0 25 mg Oral HS PRN KIM Mares    cefTRIAXone 1,000 mg Intravenous Q24H Ruddy Elam MD Last Rate: Stopped (04/30/18 1941)   ketorolac 15 mg Intravenous Q6H PRN KIM Mares    lactated ringers 125 mL/hr Intravenous Continuous KIM Mares Last Rate: 125 mL/hr (05/01/18 1330)   norgestimate-ethinyl estradiol 1 tablet Oral Daily KIM Mares    pantoprazole 40 mg Oral BID JENNIE Lao PA-C         Today, Patient Was Seen By: Aravind John MD    ** Please Note: Dictation voice to text software may have been used in the creation of this document   **

## 2018-05-01 NOTE — CASE MANAGEMENT
Initial Clinical Review    Admission: Date/Time/Statement: 4/30/18 @ 1920     Orders Placed This Encounter   Procedures    Inpatient Admission (expected length of stay for this patient is greater than two midnights)     Standing Status:   Standing     Number of Occurrences:   1     Order Specific Question:   Admitting Physician     Answer:   Arthur Motley [[id number]]     Order Specific Question:   Level of Care     Answer:   Med Surg [[id number]]     Order Specific Question:   Estimated length of stay     Answer:   Not Applicable         ED: Date/Time/Mode of Arrival:   ED Arrival Information     Expected Arrival Acuity Means of Arrival Escorted By Service Admission Type    - 4/30/2018 15:55 Urgent Walk-In Family Member General Medicine Urgent    Arrival Complaint    Abdomenal pain          Chief Complaint:   Chief Complaint   Patient presents with    Abdominal Pain     had fever on Tuesday and Wednesday started with vomiting and abd pain on Thursday   Saw PMD on Thursday neg flu but treated with Tamaflu  Then on Saturday spoke with  a doctor via phone who then prescribed levaquin and Flagyl       History of Illness: Zaid Rodriguez is a 24 y o  female with PMH of anxiety and migraine who presents with abdominal pain, nauseous vomiting since Thursday last week  She states she had fever and chills on Tuesday and Wednesday last week  Tmax 102  1  Fever broke since Thursday last week  She saw her PCP on Thursday  Had negative flu swab but was prescribed Tamiflu  She developed abdominal pain, nauseous vomiting a couple hours after taking Tamiflu on Thursday  She called her PCP on Saturday  Was told to stop taking Tamiflu and prescribed Levaquin/ Flagyl on Saturday by an on call physician  She reports abdominal pain is located in the mid upper and left upper quadrant, nonradiating, constant, felt bloated, worse after throwing up,5/10 currently    She reports vomiting a couple of times per day since Thursday with stomach content  She reports taking Tylenol and migraine medications at home in past 5 days  She reports unable to tolerate solid food in past 5 days, but was keeping herself hydrated with liquids  She reports lightheadedness for past 2 days  That is why she came to ED today  She denies chest pains, SOB, diarrhea  She denies runny nose, sore throat, cough  She reports urinary urgency and hesitancy for past couple of days  She reports headache  Patient appears dehydrated     Abdominal: Soft  Bowel sounds are normal  She exhibits distension  There is tenderness  Epigastric and left upper quadrant tender to touch  ED Vital Signs:   ED Triage Vitals [04/30/18 1620]   Temperature Pulse Respirations Blood Pressure SpO2   99 1 °F (37 3 °C) 78 18 104/55 100 %      Temp Source Heart Rate Source Patient Position - Orthostatic VS BP Location FiO2 (%)   Tympanic Monitor Sitting Right arm --      Pain Score       Worst Possible Pain        Wt Readings from Last 1 Encounters:   04/30/18 55 3 kg (122 lb)       Vital Signs (abnormal): Abnormal Labs/Diagnostic Test Results:   WBC 11 20   H  4 80 - 10 80 Thousand/uL Final     AST 5 - 45 U/L 215   H      ALT 12 - 78 U/L 288   H      Alkaline Phosphatase 46 - 116 U/L 223   H      Albumin 3 5 - 5 0 g/dL 3 4   L    Total Bilirubin 0 20 - 1 00 mg/dL 1 90   H      Leukocytes, UA Negative Trace   A    Nitrite, UA Negative Positive   A    Protein, UA Negative mg/dl 30 (1+)   A      Urobilinogen, UA 0 2, 1 0 E U /dl E U /dl 2 0   A    Bilirubin, UA Negative Interference- unable to analyze   A      Bacteria, UA None Seen, Occasional /hpf Moderate   A      CT ABDOMEN  Hepatomegaly  Mild diffuse gastric wall thickening could relate to gastritis or underdistention   Trace bilateral pleural effusions      ED Treatment:   Medication Administration from 04/30/2018 1555 to 04/30/2018 2002       Date/Time Order Dose Route Action Action by Comments     04/30/2018 0485 ketorolac (TORADOL) injection 30 mg 30 mg Intravenous Given Meg Solis RN      04/30/2018 1714 aluminum-magnesium hydroxide-simethicone (MYLANTA) 200-200-20 mg/5 mL oral suspension 30 mL 30 mL Oral Given Meg Solis RN      04/30/2018 1715 famotidine (PEPCID) injection 20 mg 20 mg Intravenous Given Meg Solis RN      04/30/2018 1704 ondansetron (ZOFRAN) injection 4 mg 4 mg Intravenous Given Meg Solis RN      04/30/2018 1941 sodium chloride 0 9 % bolus 1,000 mL 0 mL Intravenous Stopped Maryann Cool RN      04/30/2018 1704 sodium chloride 0 9 % bolus 1,000 mL 1,000 mL Intravenous [address] Meg Solis RN      04/30/2018 1941 cefTRIAXone (ROCEPHIN) IVPB (premix) 1,000 mg 0 mg Intravenous Stopped Mrayann Cool RN      04/30/2018 1848 cefTRIAXone (ROCEPHIN) IVPB (premix) 1,000 mg 1,000 mg Intravenous [address] Maryann Cool RN      04/30/2018 1838 iohexol (OMNIPAQUE) 350 MG/ML injection (MULTI-DOSE) 100 mL 100 mL Intravenous Given Verena Kimble           Past Medical/Surgical History: Active Ambulatory Problems     Diagnosis Date Noted    Gastritis 04/30/2018     Resolved Ambulatory Problems     Diagnosis Date Noted    No Resolved Ambulatory Problems     Past Medical History:   Diagnosis Date    Anxiety     Migraine        Admitting Diagnosis: Hepatomegaly [R16 0]  Gastritis [K29 70]  Abdominal pain [R10 9]    Age/Sex: 24 y o  female    Assessment/Plan:   * Acute epigastric pain   Assessment & Plan     · CT abdomen pelvis with IV contrast showed mild diffuse gastric wall thickening could relate to gastritis or underdistention  Was on Levaquin/Flagyl p o  since Saturday for epigastric pain/nausea/vomiting  · NPO  IV fluids  Pain control  Protonix p o  Sherre Souli Hold off antibiotics  · Consult GI in a m          Nausea & vomiting   Assessment & Plan     · Multifactorial   Secondary to UTI, gastritis, 2/2 elevated liver enzymes    · CT abdomen pelvis showed hepatomegaly, mild diffuse gastric wall thickening could relate to gastritis or underdistention  · UA+ ketones  Will check Lactic acid  · NPO  Antiemetic p r n  IV fluids  Elevated LFTs   Assessment & Plan     · , , alk-phos 223, total bilirubin 1  9  Unknown clear etiology  Took 4 doses of Tamiflu 75mg before GI symptoms started  ? Side effect of Tamilfu  Patient took tylenol and migraine medication at home in past 5 days as well  · CT abdomen pelvis showed hepatomegaly, no gallstones or CBD stones  · Liver ultrasound in a   Marcine Grow Check acute hepatitis panel  Check Tylenol and salicylate level stat  Will check mononucleosis per patient request   · NPO  · Consult GI  Hepatomegaly   Assessment & Plan     For liver ultrasound in a          UTI (urinary tract infection)   Assessment & Plan     · IV Rocephin  · Will follow urine culture  · Kidneys/ureters unremarkable on CT scan       Leukocytosis   Assessment & Plan     · Mild  WBC 11 2  Patient does not meet sepsis criteria  · Received iv Rocephin for UTI in ED  Will continue  Will repeat lab in AM       Migraine   Assessment & Plan     · Patient takes Fioricet with codeine p r n  at home  Will hold due to elevated LFTs  · Toradol p r n      Anxiety   Assessment & Plan     Continue Xanax p r n           VTE Prophylaxis: low risk   / sequential compression device   Code Status: Full code  POLST: POLST form is not discussed and not completed at this time   Anticipated Length of Stay:  Patient will be admitted on an Inpatient basis with an anticipated length of stay of  > 2 midnights     Justification for Hospital Stay:  Acute epigastric pain, nauseous, vomiting, elevated LFTs, hepatomegaly, UTI        Admission Orders:  ACUTE  LOC  CONSULT GI  NPO  Scheduled Meds:   Current Facility-Administered Medications:  ALPRAZolam 0 25 mg Oral HS PRN KIM Mares    cefTRIAXone 1,000 mg Intravenous Q24H Ruddy Elam MD Last Rate: Stopped (04/30/18 1941)   ketorolac 15 mg Intravenous Q6H PRN KIM Morillo lactated ringers 125 mL/hr Intravenous Continuous KIM Mares Last Rate: 125 mL/hr (05/01/18 0527)   norgestimate-ethinyl estradiol 1 tablet Oral Daily KIM Mares    pantoprazole 40 mg Oral Early Morning KIM Mares      Continuous Infusions:   lactated ringers 125 mL/hr Last Rate: 125 mL/hr (05/01/18 0527)     PRN Meds:   ALPRAZolam    ketorolac

## 2018-05-02 VITALS
OXYGEN SATURATION: 100 % | HEIGHT: 63 IN | BODY MASS INDEX: 21.62 KG/M2 | HEART RATE: 58 BPM | DIASTOLIC BLOOD PRESSURE: 76 MMHG | WEIGHT: 122 LBS | SYSTOLIC BLOOD PRESSURE: 112 MMHG | TEMPERATURE: 98.3 F | RESPIRATION RATE: 16 BRPM

## 2018-05-02 LAB
ALBUMIN SERPL BCP-MCNC: 2.7 G/DL (ref 3.5–5)
ALP SERPL-CCNC: 208 U/L (ref 46–116)
ALT SERPL W P-5'-P-CCNC: 271 U/L (ref 12–78)
ANION GAP SERPL CALCULATED.3IONS-SCNC: 7 MMOL/L (ref 4–13)
AST SERPL W P-5'-P-CCNC: 246 U/L (ref 5–45)
BACTERIA UR CULT: NORMAL
BASOPHILS # BLD AUTO: 0 THOUSANDS/ΜL (ref 0–0.1)
BASOPHILS NFR BLD AUTO: 0 % (ref 0–1)
BILIRUB SERPL-MCNC: 2 MG/DL (ref 0.2–1)
BUN SERPL-MCNC: 8 MG/DL (ref 5–25)
CALCIUM SERPL-MCNC: 8.6 MG/DL (ref 8.3–10.1)
CHLORIDE SERPL-SCNC: 104 MMOL/L (ref 100–108)
CO2 SERPL-SCNC: 28 MMOL/L (ref 21–32)
CREAT SERPL-MCNC: 0.97 MG/DL (ref 0.6–1.3)
EOSINOPHIL # BLD AUTO: 0 THOUSAND/ΜL (ref 0–0.61)
EOSINOPHIL NFR BLD AUTO: 0 % (ref 0–6)
ERYTHROCYTE [DISTWIDTH] IN BLOOD BY AUTOMATED COUNT: 15.2 % (ref 11.6–15.1)
GFR SERPL CREATININE-BSD FRML MDRD: 82 ML/MIN/1.73SQ M
GLUCOSE SERPL-MCNC: 110 MG/DL (ref 65–140)
HCT VFR BLD AUTO: 37 % (ref 37–47)
HGB BLD-MCNC: 11.8 G/DL (ref 12–16)
HYPERCHROMIA BLD QL SMEAR: PRESENT
LYMPHOCYTES # BLD AUTO: 7.9 THOUSANDS/ΜL (ref 0.6–4.47)
LYMPHOCYTES NFR BLD AUTO: 80 % (ref 14–44)
MCH RBC QN AUTO: 28.9 PG (ref 27–31)
MCHC RBC AUTO-ENTMCNC: 31.9 G/DL (ref 31.4–37.4)
MCV RBC AUTO: 91 FL (ref 82–98)
MONOCYTES # BLD AUTO: 0.7 THOUSAND/ΜL (ref 0.17–1.22)
MONOCYTES NFR BLD AUTO: 7 % (ref 4–12)
MRSA NOSE QL CULT: NORMAL
NEUTROPHILS # BLD AUTO: 1.2 THOUSANDS/ΜL (ref 1.85–7.62)
NEUTS SEG NFR BLD AUTO: 13 % (ref 43–75)
NRBC BLD AUTO-RTO: 0 /100 WBCS
PLATELET # BLD AUTO: 198 THOUSANDS/UL (ref 130–400)
PLATELET BLD QL SMEAR: ADEQUATE
PMV BLD AUTO: 8.6 FL (ref 8.9–12.7)
POTASSIUM SERPL-SCNC: 4.2 MMOL/L (ref 3.5–5.3)
PROT SERPL-MCNC: 6.4 G/DL (ref 6.4–8.2)
RBC # BLD AUTO: 4.09 MILLION/UL (ref 4.2–5.4)
SODIUM SERPL-SCNC: 139 MMOL/L (ref 136–145)
WBC # BLD AUTO: 9.8 THOUSAND/UL (ref 4.8–10.8)

## 2018-05-02 PROCEDURE — 85025 COMPLETE CBC W/AUTO DIFF WBC: CPT | Performed by: INTERNAL MEDICINE

## 2018-05-02 PROCEDURE — 99239 HOSP IP/OBS DSCHRG MGMT >30: CPT | Performed by: INTERNAL MEDICINE

## 2018-05-02 PROCEDURE — 80053 COMPREHEN METABOLIC PANEL: CPT | Performed by: INTERNAL MEDICINE

## 2018-05-02 PROCEDURE — 99232 SBSQ HOSP IP/OBS MODERATE 35: CPT | Performed by: INTERNAL MEDICINE

## 2018-05-02 RX ORDER — ONDANSETRON 2 MG/ML
4 INJECTION INTRAMUSCULAR; INTRAVENOUS EVERY 4 HOURS PRN
Status: DISCONTINUED | OUTPATIENT
Start: 2018-05-02 | End: 2018-05-02 | Stop reason: HOSPADM

## 2018-05-02 RX ORDER — ONDANSETRON 4 MG/1
4 TABLET, FILM COATED ORAL EVERY 8 HOURS PRN
Qty: 12 TABLET | Refills: 0 | Status: SHIPPED | OUTPATIENT
Start: 2018-05-02 | End: 2019-09-10

## 2018-05-02 RX ORDER — PANTOPRAZOLE SODIUM 40 MG/1
40 TABLET, DELAYED RELEASE ORAL
Qty: 60 TABLET | Refills: 0 | Status: SHIPPED | OUTPATIENT
Start: 2018-05-02 | End: 2018-07-12 | Stop reason: SDUPTHER

## 2018-05-02 RX ADMIN — SODIUM CHLORIDE, SODIUM LACTATE, POTASSIUM CHLORIDE, AND CALCIUM CHLORIDE 125 ML/HR: .6; .31; .03; .02 INJECTION, SOLUTION INTRAVENOUS at 06:14

## 2018-05-02 RX ADMIN — ONDANSETRON 4 MG: 2 INJECTION INTRAMUSCULAR; INTRAVENOUS at 13:48

## 2018-05-02 RX ADMIN — PANTOPRAZOLE SODIUM 40 MG: 40 TABLET, DELAYED RELEASE ORAL at 17:06

## 2018-05-02 RX ADMIN — ONDANSETRON 4 MG: 2 INJECTION INTRAMUSCULAR; INTRAVENOUS at 08:55

## 2018-05-02 RX ADMIN — PANTOPRAZOLE SODIUM 40 MG: 40 TABLET, DELAYED RELEASE ORAL at 06:14

## 2018-05-02 RX ADMIN — NORGESTIMATE AND ETHINYL ESTRADIOL 1 TABLET: KIT at 13:16

## 2018-05-02 NOTE — ASSESSMENT & PLAN NOTE
Likely secondary to gastritis  Continue Protonix and p r n   Zofran  Follow-up with GI as outpatient

## 2018-05-02 NOTE — DISCHARGE SUMMARY
Discharge- Katerine Guy 1995, 24 y o  female MRN: [id number]    Unit/Bed#: [address] [place] [address] Encounter: [id number]    Primary Care Provider: Libra Rodriguez MD   Date and time admitted to hospital: 4/30/2018  4:27 PM        * Acute epigastric pain   Assessment & Plan    · CT abdomen pelvis with IV contrast showed mild diffuse gastric wall thickening could relate to gastritis or underdistention  Amylase and lipase normal   · Patient will be continued on Protonix 40 milligram p o  b i d   · Patient could not get Carafate due to interaction with trazodone  · No plan for endoscopy unless refractory symptoms  · Patient is tolerating clear liquid diet which was advanced to like me which she tolerated  · Patient will be discharged home on Protonix and Zofran p r n            Hepatomegaly   Assessment & Plan    Patient also noted to have elevated transaminases, alkaline phosphatase and bilirubin  Right upper quadrant ultrasound showed thickened gallbladder wall without any evidence of cholecystitis  Likely viral syndrome  Bilirubin continues to remain stable and AST and ALT are slightly higher today  Repeat LFTs in 5 days  If patient continued to have persistently elevated LFTs patient also allergic workup by GI P        UTI (urinary tract infection)   Assessment & Plan    · Patient is on IV Rocephin  · Urine cultures showed no growth  · Stop antibiotics        Elevated LFTs   Assessment & Plan    · Persistent elevation of AST, ALT, alkaline phosphatase with stable bilirubin level-likely viral syndrome  · CT abdomen pelvis showed hepatomegaly, no gallstones or CBD stones  · Ultrasound showed thickened gallbladder wall without any evidence of cholecystitis        Leukocytosis   Assessment & Plan    · Resolved        Nausea & vomiting   Assessment & Plan    Likely secondary to gastritis  Continue Protonix and p r n   Zofran  Follow-up with GI as outpatient          Migraine   Assessment & Plan    · Patient takes Fioricet with codeine p r n  at home  Anxiety   Assessment & Plan    Continue Xanax p r n                 Discharging Physician / Practitioner: Nkechi Johnson MD  PCP: Sendy Gaviria MD  Admission Date:   Admission Orders     Ordered        04/30/18 1920  Inpatient Admission (expected length of stay for this patient is greater than two midnights)  Once             Discharge Date: 05/02/18    Resolved Problems  Date Reviewed: 5/2/2018    None          Consultations During Hospital Stay:  Dr Edgar Magaña      Outpatient Tests Requested:  · LFTs on May 7, 5512    Complications: None    Reason for Admission:  Abdominal pain, nausea and vomiting    Hospital Course:     Eric Flores is a 24 y o  female patient with past history of anxiety, migraine who originally presented to the hospital on 4/30/2018 due to abdominal pain associated some nausea and vomiting  At home patient had temperature associated with some body aches and was prescribed Tamiflu  Later patient started having abdominal pain with some nausea and patient was started on Levaquin and Flagyl  In the ED patient's blood work showed elevated LFTs and UA is positive for UTI  CT scan abdomen pelvis showed possible gastritis, hepatomegaly and trace bilateral pleural effusions  Patient was started on IV Rocephin and patient was seen in consultation with GI  Later patient was started on Protonix b i d   Patient's LFTs continued to remain high  Right upper quadrant ultrasound did not show any evidence of acute cholecystitis or obstructive pathology  Patient's hepatitis panel and infectious mononucleosis test were negative  Patient's diet was slowly advanced to clear liquid and later to light meal which she was tolerating well  Patient will be discharged home on Protonix 40 milligram p o  b i d  with repeat LFTs in 5 days to follow up with GI in 1 week  Please see above list of diagnoses and related plan for additional information       Condition at Discharge: stable     Discharge Day Visit / Exam:     Subjective:  Patient is still complaining of minimal upper abdominal pain  Patient does have nausea but denies any vomiting  Patient tolerated breakfast and lunch  Vitals: Blood Pressure: 112/76 (05/02/18 1319)  Pulse: 58 (05/02/18 1319)  Temperature: 98 3 °F (36 8 °C) (05/02/18 1319)  Temp Source: Tympanic (05/02/18 1319)  Respirations: 16 (05/02/18 1319)  Height: 5' 3" (160 cm) (04/30/18 2005)  Weight - Scale: 55 3 kg (122 lb) (04/30/18 2005)  SpO2: 100 % (05/02/18 1319)  Exam:   Physical Exam   Constitutional: No distress  HENT:   Head: Normocephalic and atraumatic  Nose: Nose normal    Eyes: Conjunctivae and EOM are normal  Pupils are equal, round, and reactive to light  Neck: Normal range of motion  Neck supple  No JVD present  Cardiovascular: Normal rate, regular rhythm and normal heart sounds  Exam reveals no gallop and no friction rub  No murmur heard  Pulmonary/Chest: Effort normal and breath sounds normal  No respiratory distress  She has no wheezes  She has no rales  She exhibits no tenderness  Abdominal: Soft  Bowel sounds are normal  She exhibits no distension  There is tenderness  There is no rebound and no guarding  Mild epigastric tenderness   Musculoskeletal: She exhibits no edema  Neurological: She is alert  No cranial nerve deficit  Skin: Skin is warm and dry  No rash noted  Psychiatric: She has a normal mood and affect  Discharge instructions/Information to patient and family:   See after visit summary for information provided to patient and family  Provisions for Follow-Up Care:  See after visit summary for information related to follow-up care and any pertinent home health orders        Disposition:     Home    For Discharges to [facility] SNF:   · Not Applicable to this Patient - Not Applicable to this Patient    Planned Readmission: No     Discharge Statement:  I spent 35 minutes discharging the patient  This time was spent on the day of discharge  I had direct contact with the patient on the day of discharge  Greater than 50% of the total time was spent examining patient, answering all patient questions, arranging and discussing plan of care with patient as well as directly providing post-discharge instructions  Additional time then spent on discharge activities  Discharge Medications:  See after visit summary for reconciled discharge medications provided to patient and family        ** Please Note: This note has been constructed using a voice recognition system **

## 2018-05-02 NOTE — ASSESSMENT & PLAN NOTE
Patient also noted to have elevated transaminases, alkaline phosphatase and bilirubin  Right upper quadrant ultrasound showed thickened gallbladder wall without any evidence of cholecystitis  Likely viral syndrome  Bilirubin continues to remain stable and AST and ALT are slightly higher today  Repeat LFTs in 5 days  If patient continued to have persistently elevated LFTs patient also allergic workup by GI P

## 2018-05-02 NOTE — PROGRESS NOTES
Progress Note - Quentin Median 24 y o  female MRN: [id number]    Unit/Bed#: [address] [address]- Encounter: [id number]    Assessment and Plan:   Principal Problem:    Acute epigastric pain  Active Problems:    Hepatomegaly    UTI (urinary tract infection)    Elevated LFTs    Leukocytosis    Nausea & vomiting    Migraine    Anxiety    #1  Nausea, vomiting, and epigastric pain: tolerated clears yesterday, had nausea last night and this morning without vomiting, continues to have some epigastric discomfort    -Continue clears as tolerated, Advance as tolerated  -Antiemetics as needed, encouraged her to use them before eating if she was nauseous  -Countinue Protonix BID for now  -Would only recommend endoscopy in this patient if she had persistent symptoms despite supportive care and medications, likely as an outpatient if needed     #2  Elevated LFTs: likely viral related, signs of hepatitis on RUQ US  LFTs slightly worsened today with , , alk-phos 208, T bili 2 0  Mono test negative  Hepatitis panel negative  -continue to monitor closely  Would only consider further workup if LFTs are not improving    -Once LFTs trending down, can be d/c home with outpatient LFT testing  Can consider serological work up as outpatient if not resolving    ----------------------------------------------------------------------------------------------------------------    Subjective:     Reports a fever last night  Continues to have some discomfort/pressure in epigastric region  Had nausea last night and this morning, improves with meds, no vomiting  Tolerated clears yesterday  Passing gas, no bowel movements since admission    Objective:     Vitals: Blood pressure 98/59, pulse 60, temperature (!) 97 1 °F (36 2 °C), temperature source Tympanic, resp  rate 16, height 5' 3" (1 6 m), weight 55 3 kg (122 lb), last menstrual period 04/28/2018, SpO2 98 %, not currently breastfeeding  ,Body mass index is 21 61 kg/m²        Intake/Output Summary (Last 24 hours) at 05/02/18 0806  Last data filed at 05/02/18 0600   Gross per 24 hour   Intake             1500 ml   Output                0 ml   Net             1500 ml       Physical Exam:     General Appearance: Alert, appears stated age and cooperative  Lungs: Clear to auscultation bilaterally, no rales or rhonchi, no labored breathing/accessory muscle use  Heart: Regular rate and rhythm, S1, S2 normal, no murmur, click, rub or gallop  Abdomen: Soft, epigastric discomfort to palpation, non-distended; bowel sounds normal; no masses or no organomegaly  Extremities: No cyanosis, clubbing, or edema    Invasive Devices     Peripheral Intravenous Line            Peripheral IV 04/30/18 Right Antecubital 1 day                Lab Results:    Results from last 7 days  Lab Units 05/02/18  0626   WBC Thousand/uL 9 80   HEMOGLOBIN g/dL 11 8*   HEMATOCRIT % 37 0   PLATELETS Thousands/uL 198   NEUTROS PCT % 13*   LYMPHS PCT % 80*   MONOS PCT % 7   EOS PCT % 0       Results from last 7 days  Lab Units 05/02/18  0626   SODIUM mmol/L 139   POTASSIUM mmol/L 4 2   CHLORIDE mmol/L 104   CO2 mmol/L 28   BUN mg/dL 8   CREATININE mg/dL 0 97   CALCIUM mg/dL 8 6   TOTAL PROTEIN g/dL 6 4   BILIRUBIN TOTAL mg/dL 2 00*   ALK PHOS U/L 208*   ALT U/L 271*   AST U/L 246*   GLUCOSE RANDOM mg/dL 110           Results from last 7 days  Lab Units 04/30/18  1703   LIPASE u/L 293   AMYLASE IU/L 95       Imaging Studies: I have personally reviewed pertinent imaging studies  Us Liver    Result Date: 5/1/2018  Impression: 1  Mild gallbladder wall thickening though this is likely secondary to underdistention  No stones or pericholecystic fluid identified  2   Relatively decreased echogenicity of the liver relative to the right kidney  Given normal renal function and elevated LFTs, this can be seen with hepatitis  Workstation performed: [id number]     Ct Abdomen Pelvis With Contrast    Result Date: 4/30/2018  Impression: Hepatomegaly  Mild diffuse gastric wall thickening could relate to gastritis or underdistention  Trace bilateral pleural effusions   Workstation performed: [id number]

## 2018-05-02 NOTE — NURSING NOTE
IV access discontinued  Discharge instructions discussed with patient, all questions answered  Patient left 3N stable, all personal belongings at side

## 2018-05-02 NOTE — CASE MANAGEMENT
Notification of Discharge  This is a Notification of Discharge from our facility [address]  Please be advised that this patient has been discharge from our facility  Below you will find the admission and discharge date and time including the patients disposition  PRESENTATION DATE: 4/30/2018  4:27 PM  IP ADMISSION DATE: 4/30/18 1920  DISCHARGE DATE: No discharge date for patient encounter  DISPOSITION: Final discharge disposition not confirmed    [facility] in the [facility] [place] for 2017  Network Utilization Review Department  Phone: [phone]; Fax [phone]  ATTENTION: The Network Utilization Review Department is now centralized for our 7 Facilities  Make a note that we have a new phone and fax numbers for our Department  Please call with any questions or concerns to [phone] and carefully follow the prompts so that you are directed to the right person  All voicemails are confidential  Fax any determinations, approvals, denials, and requests for initial or continue stay review clinical to [phone]  Due to HIGH CALL volume, it would be easier if you could please send faxed requests to expedite your requests and in part, help us provide discharge notifications faster

## 2018-05-02 NOTE — ASSESSMENT & PLAN NOTE
· Persistent elevation of AST, ALT, alkaline phosphatase with stable bilirubin level-likely viral syndrome  · CT abdomen pelvis showed hepatomegaly, no gallstones or CBD stones    · Ultrasound showed thickened gallbladder wall without any evidence of cholecystitis

## 2018-05-02 NOTE — ASSESSMENT & PLAN NOTE
· CT abdomen pelvis with IV contrast showed mild diffuse gastric wall thickening could relate to gastritis or underdistention    Amylase and lipase normal   · Patient will be continued on Protonix 40 milligram p o  b i d   · Patient could not get Carafate due to interaction with trazodone  · No plan for endoscopy unless refractory symptoms  · Patient is tolerating clear liquid diet which was advanced to like me which she tolerated  · Patient will be discharged home on Protonix and Zofran p r n

## 2018-05-07 LAB
BACTERIA BLD CULT: NORMAL
BACTERIA BLD CULT: NORMAL

## 2018-06-18 ENCOUNTER — OFFICE VISIT (OUTPATIENT)
Dept: GASTROENTEROLOGY | Facility: CLINIC | Age: 23
End: 2018-06-18
Payer: COMMERCIAL

## 2018-06-18 VITALS
BODY MASS INDEX: 21.58 KG/M2 | WEIGHT: 121.8 LBS | SYSTOLIC BLOOD PRESSURE: 108 MMHG | DIASTOLIC BLOOD PRESSURE: 66 MMHG | HEIGHT: 63 IN | TEMPERATURE: 98.4 F | HEART RATE: 74 BPM

## 2018-06-18 DIAGNOSIS — R11.15 NON-INTRACTABLE CYCLICAL VOMITING WITH NAUSEA: ICD-10-CM

## 2018-06-18 DIAGNOSIS — R10.13 EPIGASTRIC ABDOMINAL PAIN: ICD-10-CM

## 2018-06-18 DIAGNOSIS — R79.89 ELEVATED LFTS: Primary | ICD-10-CM

## 2018-06-18 PROCEDURE — 99213 OFFICE O/P EST LOW 20 MIN: CPT | Performed by: INTERNAL MEDICINE

## 2018-06-18 RX ORDER — FLUTICASONE PROPIONATE 50 MCG
1 SPRAY, SUSPENSION (ML) NASAL 2 TIMES DAILY
Refills: 0 | COMMUNITY
Start: 2018-04-24

## 2018-06-18 NOTE — LETTER
June 18, 2018     Nyla Millie, [address] [facility] [address] [address]  [address] [address]    Patient: Alistari Castaneda   YOB: 1995   Date of Visit: 6/18/2018       Dear Dr Peter Boudreaux:    Thank you for referring Alistair Castaneda to me for evaluation  Below are my notes for this consultation  If you have questions, please do not hesitate to call me  I look forward to following your patient along with you  Sincerely,        Mayra Mascorro MD        CC: No Recipients  Mayra Mascorro MD  6/18/2018  4:06 PM  Sign at close encounter  [address] [facility]  Alistair Castaneda 24 y o  female MRN: [id number]            Assessment & Plan:    Pleasant 79-year-old female admitted with abdominal discomfort, nausea, vomiting, elevated LFTs  Here for follow-up  1   Elevated LFTs:  Significantly improved, likely due to medication side effect or viral syndrome  -will repeat LFTs  -ultrasound was unremarkable during hospital stay  -if LFTs are normal no further workup is needed at this time    2   epigastric abdominal pain:  Patient may have had a peptic ulcer, she is treated with PPI therapy upon discharge reports complete improvement in her symptoms   -most likely precipitated by aspirin component of Fioricet  -encouraged the patient to minimize Fioricet which she has only taken twice since discharge  -discussed with PCP for alternative medications for migraines if symptoms persist  -patient was instructed to give us call if she has recurrent epigastric abdominal pain at which time we would proceed with an upper endoscopy          _____________________________________________________________        CC: Follow-up recent hospital stay    HPI:  Alistair Castaneda is a 24 y  o female who is here for follow-up from hospital stay which time she was admitted for epigastric abdominal pain, nausea, vomiting, found to have elevated LFTs    Patient has a history of epigastric pain, this is in the setting of fairly frequent Fioricet use for migraine headaches, however at that time she had a viral syndrome was started on Tamiflu and antibiotics after about 1 to 2 days  She is noted to have some mild hepatomegaly on imaging studies in the hospital markedly elevated LFTs  She has had LFTs repeated 3 times since discharge and they have gradually improved, nearly normalized  Patient reports still occasional epigastric discomfort  Otherwise overall much better  No further nausea, vomiting, bowel movements are regular  ROS:  The remainder of the ROS was negative except for the pertinent positives mentioned in HPI        Allergies: Trazamine [trazodone & diet manage prod]    Medications:   Current Outpatient Prescriptions:     ALPRAZolam (XANAX) 0 25 mg tablet, Take 0 25 mg by mouth daily at bedtime as needed for anxiety, Disp: , Rfl:     butalbital-aspirin-caffeine-codeine (FIORINAL WITH CODEINE) -06-30 mg per capsule, Take 1 capsule by mouth every 6 (six) hours as needed for headaches, migraine, mild pain or moderate pain, Disp: , Rfl:     fluticasone (FLONASE) 50 mcg/act nasal spray, 1 spray 2 (two) times a day, Disp: , Rfl: 0    norgestimate-ethinyl estradiol (ORTHO TRI-CYCLEN LO) 0 18/0 215/0 25 MG-25 MCG per tablet, Take 1 tablet by mouth daily, Disp: , Rfl:     ondansetron (ZOFRAN) 4 mg tablet, Take 1 tablet (4 mg total) by mouth every 8 (eight) hours as needed for nausea or vomiting for up to 4 days, Disp: 12 tablet, Rfl: 0    pantoprazole (PROTONIX) 40 mg tablet, Take 1 tablet (40 mg total) by mouth 2 (two) times a day before meals, Disp: 60 tablet, Rfl: 0    Past Medical History:   Diagnosis Date    Anxiety     Migraine        Past Surgical History:   Procedure Laterality Date    FRACTURE SURGERY      right ankle     KNEE ARTHROSCOPY      WISDOM TOOTH EXTRACTION Bilateral     uooers and lowers        Family History   Problem Relation Age of Onset    Cancer Maternal Grandmother     Diabetes Maternal Grandfather     Gout Paternal Grandfather         reports that she has never smoked  She has never used smokeless tobacco  She reports that she drinks alcohol  She reports that she does not use drugs        Physical Exam:    /66 (BP Location: Right arm, Patient Position: Sitting, Cuff Size: Standard)   Pulse 74   Temp 98 4 °F (36 9 °C) (Tympanic)   Ht 5' 3" (1 6 m)   Wt 55 2 kg (121 lb 12 8 oz)   BMI 21 58 kg/m²      Gen: wn/wd, NAD  HEENT: anicteric, MMM, no cervical LAD  CVS: RRR, no m/r/g  CHEST: CTA b/l  ABD: +BS, soft, NT,ND, no hepatosplenomegaly  EXT: no c/c/e  NEURO: aaox3  SKIN: NO rashes

## 2018-06-18 NOTE — PROGRESS NOTES
[facility]  Luis Harley 24 y o  female MRN: [id number]            Assessment & Plan:    Pleasant 24-year-old female admitted with abdominal discomfort, nausea, vomiting, elevated LFTs  Here for follow-up  1   Elevated LFTs:  Significantly improved, likely due to medication side effect or viral syndrome  -will repeat LFTs  -ultrasound was unremarkable during hospital stay  -if LFTs are normal no further workup is needed at this time    2   epigastric abdominal pain:  Patient may have had a peptic ulcer, she is treated with PPI therapy upon discharge reports complete improvement in her symptoms   -most likely precipitated by aspirin component of Fioricet  -encouraged the patient to minimize Fioricet which she has only taken twice since discharge  -discussed with PCP for alternative medications for migraines if symptoms persist  -patient was instructed to give us call if she has recurrent epigastric abdominal pain at which time we would proceed with an upper endoscopy          _____________________________________________________________        CC: Follow-up recent hospital stay    HPI:  Luis Harley is a 24 y  o female who is here for follow-up from hospital stay which time she was admitted for epigastric abdominal pain, nausea, vomiting, found to have elevated LFTs  Patient has a history of epigastric pain, this is in the setting of fairly frequent Fioricet use for migraine headaches, however at that time she had a viral syndrome was started on Tamiflu and antibiotics after about 1 to 2 days  She is noted to have some mild hepatomegaly on imaging studies in the hospital markedly elevated LFTs  She has had LFTs repeated 3 times since discharge and they have gradually improved, nearly normalized  Patient reports still occasional epigastric discomfort  Otherwise overall much better  No further nausea, vomiting, bowel movements are regular        ROS:  The remainder of the ROS was negative except for the pertinent positives mentioned in HPI  Allergies: Trazamine [trazodone & diet manage prod]    Medications:   Current Outpatient Prescriptions:     ALPRAZolam (XANAX) 0 25 mg tablet, Take 0 25 mg by mouth daily at bedtime as needed for anxiety, Disp: , Rfl:     butalbital-aspirin-caffeine-codeine (FIORINAL WITH CODEINE) -58-30 mg per capsule, Take 1 capsule by mouth every 6 (six) hours as needed for headaches, migraine, mild pain or moderate pain, Disp: , Rfl:     fluticasone (FLONASE) 50 mcg/act nasal spray, 1 spray 2 (two) times a day, Disp: , Rfl: 0    norgestimate-ethinyl estradiol (ORTHO TRI-CYCLEN LO) 0 18/0 215/0 25 MG-25 MCG per tablet, Take 1 tablet by mouth daily, Disp: , Rfl:     ondansetron (ZOFRAN) 4 mg tablet, Take 1 tablet (4 mg total) by mouth every 8 (eight) hours as needed for nausea or vomiting for up to 4 days, Disp: 12 tablet, Rfl: 0    pantoprazole (PROTONIX) 40 mg tablet, Take 1 tablet (40 mg total) by mouth 2 (two) times a day before meals, Disp: 60 tablet, Rfl: 0    Past Medical History:   Diagnosis Date    Anxiety     Migraine        Past Surgical History:   Procedure Laterality Date    FRACTURE SURGERY      right ankle     KNEE ARTHROSCOPY      WISDOM TOOTH EXTRACTION Bilateral     uooers and lowers        Family History   Problem Relation Age of Onset    Cancer Maternal Grandmother     Diabetes Maternal Grandfather     Gout Paternal Grandfather         reports that she has never smoked  She has never used smokeless tobacco  She reports that she drinks alcohol  She reports that she does not use drugs        Physical Exam:    /66 (BP Location: Right arm, Patient Position: Sitting, Cuff Size: Standard)   Pulse 74   Temp 98 4 °F (36 9 °C) (Tympanic)   Ht 5' 3" (1 6 m)   Wt 55 2 kg (121 lb 12 8 oz)   BMI 21 58 kg/m²     Gen: wn/wd, NAD  HEENT: anicteric, MMM, no cervical LAD  CVS: RRR, no m/r/g  CHEST: CTA b/l  ABD: +BS, soft, NT,ND, no hepatosplenomegaly  EXT: no c/c/e  NEURO: aaox3  SKIN: NO rashes

## 2018-06-18 NOTE — LETTER
June 18, 2018     Dee Bentley, [address] [place], [place] [address]  [address] [address]    Patient: Suhas Bundy   YOB: 1995   Date of Visit: 6/18/2018       Dear Dr Rigo Power:    Thank you for referring Suhas Bundy to me for evaluation  Below are my notes for this consultation  If you have questions, please do not hesitate to call me  I look forward to following your patient along with you           Sincerely,        Krysta Rivers MD        CC: No Recipients

## 2018-06-22 LAB
ALBUMIN SERPL-MCNC: 4.2 G/DL (ref 3.5–5.5)
ALP SERPL-CCNC: 50 IU/L (ref 39–117)
ALT SERPL-CCNC: 17 IU/L (ref 0–32)
AMBIG ABBREV DEFAULT: NORMAL
AST SERPL-CCNC: 20 IU/L (ref 0–40)
BILIRUB DIRECT SERPL-MCNC: 0.11 MG/DL (ref 0–0.4)
BILIRUB SERPL-MCNC: 0.3 MG/DL (ref 0–1.2)
PROT SERPL-MCNC: 6.7 G/DL (ref 6–8.5)

## 2018-06-27 ENCOUNTER — TELEPHONE (OUTPATIENT)
Dept: GASTROENTEROLOGY | Facility: CLINIC | Age: 23
End: 2018-06-27

## 2018-06-27 NOTE — TELEPHONE ENCOUNTER
----- Message from Carlton Edouard MD sent at 6/27/2018  9:22 AM EDT -----  Please inform the patient that recent LFTs are completely normal   No further workup is needed, please have her call with any questions or concerns

## 2018-07-12 ENCOUNTER — TELEPHONE (OUTPATIENT)
Dept: GASTROENTEROLOGY | Facility: CLINIC | Age: 23
End: 2018-07-12

## 2018-07-12 DIAGNOSIS — K29.70 GASTRITIS WITHOUT BLEEDING, UNSPECIFIED CHRONICITY, UNSPECIFIED GASTRITIS TYPE: ICD-10-CM

## 2018-07-12 RX ORDER — PANTOPRAZOLE SODIUM 40 MG/1
40 TABLET, DELAYED RELEASE ORAL
Qty: 60 TABLET | Refills: 0 | Status: SHIPPED | OUTPATIENT
Start: 2018-07-12 | End: 2018-07-17 | Stop reason: SDUPTHER

## 2018-07-13 ENCOUNTER — TELEPHONE (OUTPATIENT)
Dept: GASTROENTEROLOGY | Facility: CLINIC | Age: 23
End: 2018-07-13

## 2018-07-13 NOTE — TELEPHONE ENCOUNTER
Spoke to [address] at [place]   This PA is pending     [phone]    [employer] rx number is [id number]

## 2018-07-17 DIAGNOSIS — K29.70 GASTRITIS WITHOUT BLEEDING, UNSPECIFIED CHRONICITY, UNSPECIFIED GASTRITIS TYPE: ICD-10-CM

## 2018-07-17 RX ORDER — PANTOPRAZOLE SODIUM 40 MG/1
40 TABLET, DELAYED RELEASE ORAL DAILY
Qty: 30 TABLET | Refills: 11 | Status: SHIPPED | OUTPATIENT
Start: 2018-07-17 | End: 2019-08-02 | Stop reason: SDUPTHER

## 2018-07-24 NOTE — TELEPHONE ENCOUNTER
Pantoprazole 40mg delayed release tablet #2 tablets per day has been denied  The plan covers for #1 tablet per day       Coverage for the requested amount requires the following:    Based on the information provided this does not meet the established medication specific criteria or guidelines    Please advise

## 2018-07-24 NOTE — TELEPHONE ENCOUNTER
Melissa Hodges PA-C sent an Rx for pantoprazole 1 tablet daily just last week, please see the chart  Her current prescription is for once daily    Let me know if anything actually needs to be changed, thank you

## 2018-10-31 ENCOUNTER — OFFICE VISIT (OUTPATIENT)
Dept: GASTROENTEROLOGY | Facility: CLINIC | Age: 23
End: 2018-10-31
Payer: COMMERCIAL

## 2018-10-31 VITALS
WEIGHT: 122.6 LBS | SYSTOLIC BLOOD PRESSURE: 108 MMHG | HEIGHT: 63 IN | TEMPERATURE: 97.8 F | DIASTOLIC BLOOD PRESSURE: 72 MMHG | HEART RATE: 83 BPM | BODY MASS INDEX: 21.72 KG/M2

## 2018-10-31 DIAGNOSIS — R14.0 BLOATING: ICD-10-CM

## 2018-10-31 DIAGNOSIS — R14.0 BLOATING: Primary | ICD-10-CM

## 2018-10-31 DIAGNOSIS — R10.13 EPIGASTRIC PAIN: Primary | ICD-10-CM

## 2018-10-31 DIAGNOSIS — R19.7 DIARRHEA, UNSPECIFIED TYPE: ICD-10-CM

## 2018-10-31 PROCEDURE — 99213 OFFICE O/P EST LOW 20 MIN: CPT | Performed by: PHYSICIAN ASSISTANT

## 2018-10-31 NOTE — PROGRESS NOTES
Assessment and Plan    #1  Epigastric discomfort, bloating, and loose stools: differential includes celiac disease, IBS, PUD, H pylori infection, etc  Symptoms are worse when she eats a lot of gluten  Takes PPI as needed with some improvement  -Check a celiac panel  -Plan for EGD to further assess of PUD, H pylori, duodenal biopsies, etc    -Continue PPI as needed  Can also use H2 blockers over the counter as needed  -Avoid gluten after having celiac panel performed    -Can use fiber for loose stools as this helped in the past    -If all celiac work up negative, can do further evaluation for other sources of symptoms    -Avoid NSAIDs  -Diet modifications discussed    --------------------------------------------------------------------------------------------------------------------    Chief Complaint: f/u epigastric pain, bloating, loose stools    HPI: Emir Weldon is a 24 y o  female who presents today for follow up for epigastric pain, bloating, and loose stools  Patient reports that she notices her symptoms are worse when she eats a lot of gluten containing foods  She reports that when she is avoiding gluten, her symptoms seem to be improved  She reports that she gets very bloated with some epigastric pain and lower abdominal pain  She reports that her bowel movements are typically loose or she will go at all  She reports that she was on fiber in the past which helped with her loose stools but stopped this because she was afraid this would affect her stomach  She denies taking any migraine medication for quite some time now  She denies any NSAID use  She reports that she takes a PPI as needed and has some benefit when taking it  She does not want to take this long-term if possible  She has not taken this for about 2 weeks  She reports that she has limited gluten but has about 1 roll a day  She denies any blood in the stool or black tarry stools    She denies any vomiting but has nausea and a feeling of needing to vomit at times when she becomes bloated  She has never had an EGD or colonoscopy    Review of Systems:   General: negative for fatigue, fever, night sweats or unexpected weight loss  Psychological: negative for anxiety or depression  Ophthalmic: negative for blurry vision or scleral icterus  ENT: negative for headaches, oral lesions, sore throat, vocal changes or dysphagia  Hematological and Lymphatic: negative for pallor or swollen lymph nodes  Respiratory: negative for cough, shortness of breath or wheezing  Cardiovascular: negative for chest pain, edema or murmur  Gastrointestinal: as mentioned in HPI  Genito-Urinary: negative for dysuria or incontinence  Musculoskeletal: negative for joint pain, joint stiffness or joint swelling  Dermatological: negative for pruritus, rash, or jaundice    Current Medications  Current Outpatient Prescriptions   Medication Sig Dispense Refill    ALPRAZolam (XANAX) 0 25 mg tablet Take 0 25 mg by mouth daily at bedtime as needed for anxiety      butalbital-aspirin-caffeine-codeine (FIORINAL WITH CODEINE) -90-30 mg per capsule Take 1 capsule by mouth every 6 (six) hours as needed for headaches, migraine, mild pain or moderate pain      fluticasone (FLONASE) 50 mcg/act nasal spray 1 spray 2 (two) times a day  0    norgestimate-ethinyl estradiol (ORTHO TRI-CYCLEN LO) 0 18/0 215/0 25 MG-25 MCG per tablet Take 1 tablet by mouth daily      pantoprazole (PROTONIX) 40 mg tablet Take 1 tablet (40 mg total) by mouth daily 30 tablet 11    ondansetron (ZOFRAN) 4 mg tablet Take 1 tablet (4 mg total) by mouth every 8 (eight) hours as needed for nausea or vomiting for up to 4 days 12 tablet 0     No current facility-administered medications for this visit          Past Medical History  Past Medical History:   Diagnosis Date    Anxiety     Migraine        Past Surgical History  Past Surgical History:   Procedure Laterality Date    FRACTURE SURGERY      right ankle  KNEE ARTHROSCOPY      WISDOM TOOTH EXTRACTION Bilateral     uooers and lowers        Past Social History   Social History     Social History    Marital status: /Civil Union     Spouse name: N/A    Number of children: N/A    Years of education: N/A     Social History Main Topics    Smoking status: Never Smoker    Smokeless tobacco: Never Used    Alcohol use Yes      Comment: rare    Drug use: No    Sexual activity: Not Asked     Other Topics Concern    None     Social History Narrative    None       The following portions of the patient's history were reviewed and updated as appropriate: allergies, current medications, past family history, past medical history, past social history, past surgical history and problem list     Vital Signs  Vitals:    10/31/18 0826   BP: 108/72   BP Location: Right arm   Patient Position: Sitting   Cuff Size: Standard   Pulse: 83   Temp: 97 8 °F (36 6 °C)   TempSrc: Tympanic   Weight: 55 6 kg (122 lb 9 6 oz)   Height: 5' 3" (1 6 m)       Physical Exam:  General appearance: alert, cooperative, no distress  HEENT: normocephalic, anicteric, no eye erythema or discharge, no oropharyngeal thrush  Neck: supple, trachea midline, no adenopathy  Lungs: CTA b/l, no rales, rhonchi, or wheezing, unlabored respirations  Heart: RRR, no murmur, rubs, or gallops  Abdomen: soft, non-tender, non-distended, normal bowel sounds, no masses or organomegaly  Rectal: deferred  Extremities: no cyanosis, clubbing, or edema  Musculoskeletal: normal gait  Skin: color and texture normal, no jaundice, no rashes or lesions  Psychiatric: alert and oriented, normal affect and behavior

## 2018-10-31 NOTE — LETTER
October 31, 2018     Katie Camacho, [address] [facility] [id number] [place] [address]  [address] [address]    Patient: Renato Murillo   YOB: 1995   Date of Visit: 10/31/2018       Dear Dr Kim Bolanos:    Thank you for referring Renato Murillo to me for evaluation  Below are my notes for this consultation  If you have questions, please do not hesitate to call me  I look forward to following your patient along with you  Sincerely,        Helen Denise PA-C        CC: No Recipients  Helen Denise PA-C  10/31/2018  8:51 AM  Sign at close encounter  Assessment and Plan    #1  Epigastric discomfort, bloating, and loose stools: differential includes celiac disease, IBS, PUD, H pylori infection, etc  Symptoms are worse when she eats a lot of gluten  Takes PPI as needed with some improvement  -Check a celiac panel  -Plan for EGD to further assess of PUD, H pylori, duodenal biopsies, etc    -Continue PPI as needed  Can also use H2 blockers over the counter as needed  -Avoid gluten after having celiac panel performed    -Can use fiber for loose stools as this helped in the past    -If all celiac work up negative, can do further evaluation for other sources of symptoms    -Avoid NSAIDs  -Diet modifications discussed    --------------------------------------------------------------------------------------------------------------------    Chief Complaint: f/u epigastric pain, bloating, loose stools    HPI: Renato Murillo is a 24 y o  female who presents today for follow up for epigastric pain, bloating, and loose stools  Patient reports that she notices her symptoms are worse when she eats a lot of gluten containing foods  She reports that when she is avoiding gluten, her symptoms seem to be improved  She reports that she gets very bloated with some epigastric pain and lower abdominal pain  She reports that her bowel movements are typically loose or she will go at all    She reports that she was on fiber in the past which helped with her loose stools but stopped this because she was afraid this would affect her stomach  She denies taking any migraine medication for quite some time now  She denies any NSAID use  She reports that she takes a PPI as needed and has some benefit when taking it  She does not want to take this long-term if possible  She has not taken this for about 2 weeks  She reports that she has limited gluten but has about 1 roll a day  She denies any blood in the stool or black tarry stools  She denies any vomiting but has nausea and a feeling of needing to vomit at times when she becomes bloated      She has never had an EGD or colonoscopy    Review of Systems:   General: negative for fatigue, fever, night sweats or unexpected weight loss  Psychological: negative for anxiety or depression  Ophthalmic: negative for blurry vision or scleral icterus  ENT: negative for headaches, oral lesions, sore throat, vocal changes or dysphagia  Hematological and Lymphatic: negative for pallor or swollen lymph nodes  Respiratory: negative for cough, shortness of breath or wheezing  Cardiovascular: negative for chest pain, edema or murmur  Gastrointestinal: as mentioned in HPI  Genito-Urinary: negative for dysuria or incontinence  Musculoskeletal: negative for joint pain, joint stiffness or joint swelling  Dermatological: negative for pruritus, rash, or jaundice    Current Medications  Current Outpatient Prescriptions   Medication Sig Dispense Refill    ALPRAZolam (XANAX) 0 25 mg tablet Take 0 25 mg by mouth daily at bedtime as needed for anxiety      butalbital-aspirin-caffeine-codeine (FIORINAL WITH CODEINE) -31-30 mg per capsule Take 1 capsule by mouth every 6 (six) hours as needed for headaches, migraine, mild pain or moderate pain      fluticasone (FLONASE) 50 mcg/act nasal spray 1 spray 2 (two) times a day  0    norgestimate-ethinyl estradiol (ORTHO TRI-CYCLEN LO) 0 18/0 215/0 25 MG-25 MCG per tablet Take 1 tablet by mouth daily      pantoprazole (PROTONIX) 40 mg tablet Take 1 tablet (40 mg total) by mouth daily 30 tablet 11    ondansetron (ZOFRAN) 4 mg tablet Take 1 tablet (4 mg total) by mouth every 8 (eight) hours as needed for nausea or vomiting for up to 4 days 12 tablet 0     No current facility-administered medications for this visit          Past Medical History  Past Medical History:   Diagnosis Date    Anxiety     Migraine        Past Surgical History  Past Surgical History:   Procedure Laterality Date    FRACTURE SURGERY      right ankle     KNEE ARTHROSCOPY      WISDOM TOOTH EXTRACTION Bilateral     uooers and lowers        Past Social History   Social History     Social History    Marital status: /Civil Union     Spouse name: N/A    Number of children: N/A    Years of education: N/A     Social History Main Topics    Smoking status: Never Smoker    Smokeless tobacco: Never Used    Alcohol use Yes      Comment: rare    Drug use: No    Sexual activity: Not Asked     Other Topics Concern    None     Social History Narrative    None       The following portions of the patient's history were reviewed and updated as appropriate: allergies, current medications, past family history, past medical history, past social history, past surgical history and problem list     Vital Signs  Vitals:    10/31/18 0826   BP: 108/72   BP Location: Right arm   Patient Position: Sitting   Cuff Size: Standard   Pulse: 83   Temp: 97 8 °F (36 6 °C)   TempSrc: Tympanic   Weight: 55 6 kg (122 lb 9 6 oz)   Height: 5' 3" (1 6 m)       Physical Exam:  General appearance: alert, cooperative, no distress  HEENT: normocephalic, anicteric, no eye erythema or discharge, no oropharyngeal thrush  Neck: supple, trachea midline, no adenopathy  Lungs: CTA b/l, no rales, rhonchi, or wheezing, unlabored respirations  Heart: RRR, no murmur, rubs, or gallops  Abdomen: soft, non-tender, non-distended, normal bowel sounds, no masses or organomegaly  Rectal: deferred  Extremities: no cyanosis, clubbing, or edema  Musculoskeletal: normal gait  Skin: color and texture normal, no jaundice, no rashes or lesions  Psychiatric: alert and oriented, normal affect and behavior

## 2018-11-01 ENCOUNTER — HOSPITAL ENCOUNTER (OUTPATIENT)
Facility: AMBULARY SURGERY CENTER | Age: 23
Setting detail: OUTPATIENT SURGERY
Discharge: HOME | End: 2018-11-01
Attending: INTERNAL MEDICINE | Admitting: INTERNAL MEDICINE
Payer: COMMERCIAL

## 2018-11-01 ENCOUNTER — ANESTHESIA (OUTPATIENT)
Dept: GASTROENTEROLOGY | Facility: AMBULARY SURGERY CENTER | Age: 23
End: 2018-11-01
Payer: COMMERCIAL

## 2018-11-01 VITALS
BODY MASS INDEX: 22.15 KG/M2 | DIASTOLIC BLOOD PRESSURE: 67 MMHG | TEMPERATURE: 98.7 F | SYSTOLIC BLOOD PRESSURE: 108 MMHG | HEART RATE: 63 BPM | OXYGEN SATURATION: 100 % | RESPIRATION RATE: 16 BRPM | WEIGHT: 125 LBS | HEIGHT: 63 IN

## 2018-11-01 DIAGNOSIS — R10.13 EPIGASTRIC PAIN: ICD-10-CM

## 2018-11-01 DIAGNOSIS — R14.0 BLOATING: ICD-10-CM

## 2018-11-01 DIAGNOSIS — R19.7 DIARRHEA, UNSPECIFIED TYPE: ICD-10-CM

## 2018-11-01 LAB — EXT PREGNANCY TEST URINE: NEGATIVE

## 2018-11-01 PROCEDURE — 88342 IMHCHEM/IMCYTCHM 1ST ANTB: CPT | Performed by: PATHOLOGY

## 2018-11-01 PROCEDURE — 81025 URINE PREGNANCY TEST: CPT | Performed by: INTERNAL MEDICINE

## 2018-11-01 PROCEDURE — 43239 EGD BIOPSY SINGLE/MULTIPLE: CPT | Performed by: INTERNAL MEDICINE

## 2018-11-01 PROCEDURE — 88305 TISSUE EXAM BY PATHOLOGIST: CPT | Performed by: PATHOLOGY

## 2018-11-01 RX ORDER — LIDOCAINE HYDROCHLORIDE 10 MG/ML
0.5 INJECTION, SOLUTION EPIDURAL; INFILTRATION; INTRACAUDAL; PERINEURAL ONCE AS NEEDED
Status: DISCONTINUED | OUTPATIENT
Start: 2018-11-01 | End: 2018-11-01 | Stop reason: HOSPADM

## 2018-11-01 RX ORDER — SODIUM CHLORIDE, SODIUM LACTATE, POTASSIUM CHLORIDE, CALCIUM CHLORIDE 600; 310; 30; 20 MG/100ML; MG/100ML; MG/100ML; MG/100ML
100 INJECTION, SOLUTION INTRAVENOUS CONTINUOUS
Status: DISCONTINUED | OUTPATIENT
Start: 2018-11-01 | End: 2018-11-01 | Stop reason: HOSPADM

## 2018-11-01 RX ORDER — LIDOCAINE HYDROCHLORIDE 10 MG/ML
INJECTION, SOLUTION INFILTRATION; PERINEURAL AS NEEDED
Status: DISCONTINUED | OUTPATIENT
Start: 2018-11-01 | End: 2018-11-01 | Stop reason: SURG

## 2018-11-01 RX ORDER — PROPOFOL 10 MG/ML
INJECTION, EMULSION INTRAVENOUS AS NEEDED
Status: DISCONTINUED | OUTPATIENT
Start: 2018-11-01 | End: 2018-11-01 | Stop reason: SURG

## 2018-11-01 RX ORDER — HYDROMORPHONE HCL/PF 1 MG/ML
0.5 SYRINGE (ML) INJECTION ONCE AS NEEDED
Status: DISCONTINUED | OUTPATIENT
Start: 2018-11-01 | End: 2018-11-01 | Stop reason: HOSPADM

## 2018-11-01 RX ADMIN — LIDOCAINE HYDROCHLORIDE 50 MG: 10 INJECTION, SOLUTION INFILTRATION; PERINEURAL at 11:03

## 2018-11-01 RX ADMIN — PROPOFOL 50 MG: 10 INJECTION, EMULSION INTRAVENOUS at 11:11

## 2018-11-01 RX ADMIN — PROPOFOL 50 MG: 10 INJECTION, EMULSION INTRAVENOUS at 11:07

## 2018-11-01 RX ADMIN — PROPOFOL 100 MG: 10 INJECTION, EMULSION INTRAVENOUS at 11:03

## 2018-11-01 RX ADMIN — SODIUM CHLORIDE, SODIUM LACTATE, POTASSIUM CHLORIDE, AND CALCIUM CHLORIDE 100 ML/HR: .6; .31; .03; .02 INJECTION, SOLUTION INTRAVENOUS at 10:53

## 2018-11-01 NOTE — ANESTHESIA PREPROCEDURE EVALUATION
Review of Systems/Medical History  Patient summary reviewed  Chart reviewed  History of anesthetic complications     Cardiovascular  Exercise tolerance (METS): >4,     Pulmonary       GI/Hepatic    Esophageal disease , Liver disease ,   Comment: Gastritis          Endo/Other     GYN       Hematology  Negative hematology ROS      Musculoskeletal  Negative musculoskeletal ROS        Neurology    Headaches,   Comment: Migraines  Psychology   No anxiety,              Physical Exam    Airway    Mallampati score: II  TM Distance: >3 FB  Neck ROM: full     Dental       Cardiovascular  Rhythm: regular, Rate: normal,     Pulmonary  Breath sounds clear to auscultation,     Other Findings        Anesthesia Plan  ASA Score- 2     Anesthesia Type- IV sedation with anesthesia with ASA Monitors  Additional Monitors:   Airway Plan:         Plan Factors-    Induction- intravenous  Postoperative Plan-     Informed Consent- Anesthetic plan and risks discussed with patient  I personally reviewed this patient with the CRNA  Discussed and agreed on the Anesthesia Plan with the CRNA  Tawnya Hebert

## 2018-11-01 NOTE — OP NOTE
ESOPHAGOGASTRODUODENOSCOPY    PROCEDURE: EGD    SEDATION: Monitored anesthesia care, check anesthesia records    ASA Class: 1    INDICATIONS:  Postprandial epigastric pain    CONSENT:  Informed consent was obtained for the procedure, including sedation after explaining the risks and benefits of the procedure  Risks including but not limited to bleeding, perforation, infection, and missed lesion  PREPARATION:   Telemetry, pulse oximetry, blood pressure were monitored throughout the procedure  Patient was identified by myself both verbally and by visual inspection of ID band  DESCRIPTION:   Patient was placed in the left lateral decubitus position and was sedated with the above medication  The gastroscope was introduced in to the oropharynx and the esophagus was intubated under direct visualization  Scope was passed down the esophagus up to 2nd part of the duodenum  A careful inspection was made as the gastroscope was withdrawn, including a retroflexed view of the stomach; findings and interventions are described below  FINDINGS:    #1  Esophagus- normal esophagus and GE junction    #2  Stomach- mild antral gastritis biopsies taken for H pylori  Normal retroflexion    #3  Duodenum- normal appearing duodenum, random biopsies taken throughout         IMPRESSIONS:      Normal duodenum biopsied  Mild gastritis biopsied  Normal retroflexion  Normal esophagus    RECOMMENDATIONS:     Discharge home  Resume regular diet  Resume pantoprazole once daily as this has helped in the past  Follow up biopsy results  Call with any abdominal pain, bleeding, fevers    COMPLICATIONS:  None; patient tolerated the procedure well            DISPOSITION: PACU           CONDITION: Stable

## 2018-11-01 NOTE — DISCHARGE INSTRUCTIONS
Discharge home  Resume regular diet  Resume pantoprazole once daily as this has helped in the past  Follow up biopsy results  Call with any abdominal pain, bleeding, fevers

## 2018-11-01 NOTE — H&P
History and Physical - [facility]  Fela Asencio 24 y o  female MRN: [id number]    HPI: Fela Asencio is a 24y o  year old female who presents with epigastric pain  Review of Systems    Historical Information   Past Medical History:   Diagnosis Date    Anxiety     Migraine      Past Surgical History:   Procedure Laterality Date    FRACTURE SURGERY      right ankle     KNEE ARTHROSCOPY      WISDOM TOOTH EXTRACTION Bilateral     uooers and lowers      Social History   History   Alcohol Use    Yes     Comment: rare     History   Drug Use No     History   Smoking Status    Never Smoker   Smokeless Tobacco    Never Used     Family History   Problem Relation Age of Onset    Cancer Maternal Grandmother     Diabetes Maternal Grandfather     Gout Paternal Grandfather        Meds/Allergies     Prescriptions Prior to Admission   Medication    ALPRAZolam (XANAX) 0 25 mg tablet    fluticasone (FLONASE) 50 mcg/act nasal spray    norgestimate-ethinyl estradiol (ORTHO TRI-CYCLEN LO) 0 18/0 215/0 25 MG-25 MCG per tablet    pantoprazole (PROTONIX) 40 mg tablet    butalbital-aspirin-caffeine-codeine (FIORINAL WITH CODEINE) -55-30 mg per capsule    ondansetron (ZOFRAN) 4 mg tablet       Allergies   Allergen Reactions    Trazamine [Trazodone & Diet Manage Prod]        Objective     Blood pressure 109/70, pulse 74, temperature 98 7 °F (37 1 °C), temperature source Tympanic, resp  rate 14, height 5' 3" (1 6 m), weight 56 7 kg (125 lb), SpO2 100 %, not currently breastfeeding  PHYSICAL EXAM    Gen: NAD  CV: RRR  CHEST: Clear  ABD: soft, NT/ND  EXT: no edema  Neuro: AAO      ASSESSMENT/PLAN:  This is a 24y o  year old female here for epigastric pain      PLAN:   Procedure: Wilbur Thorne

## 2018-11-02 LAB
ENDOMYSIUM IGA SER QL: NEGATIVE
IGA SERPL-MCNC: 167 MG/DL (ref 87–352)
TTG IGA SER-ACNC: <2 U/ML (ref 0–3)

## 2018-11-05 ENCOUNTER — TELEPHONE (OUTPATIENT)
Dept: GASTROENTEROLOGY | Facility: AMBULARY SURGERY CENTER | Age: 23
End: 2018-11-05

## 2018-11-05 NOTE — TELEPHONE ENCOUNTER
----- Message from Helen Denise PA-C sent at 11/5/2018  1:44 PM EST -----  Please inform patient that her celiac panel is negative  I did discuss with her that there is a range of intolerance to gluten and that if she overall feels better when she does not eat it, then she can still follow a gluten free diet  However she does not have a true celiac disease

## 2018-11-27 ENCOUNTER — TELEPHONE (OUTPATIENT)
Dept: GASTROENTEROLOGY | Facility: CLINIC | Age: 23
End: 2018-11-27

## 2018-11-27 DIAGNOSIS — K29.70 GASTRITIS WITHOUT BLEEDING, UNSPECIFIED CHRONICITY, UNSPECIFIED GASTRITIS TYPE: Primary | ICD-10-CM

## 2018-11-27 RX ORDER — RANITIDINE 150 MG/1
150 CAPSULE ORAL EVERY EVENING
Qty: 30 CAPSULE | Refills: 5 | Status: SHIPPED | OUTPATIENT
Start: 2018-11-27 | End: 2019-08-02 | Stop reason: SDUPTHER

## 2018-11-27 NOTE — TELEPHONE ENCOUNTER
greg patient      She called to let the provider know that the zantac 150 mg is working for her and she would like it called into [facility] [phone]

## 2019-06-19 ENCOUNTER — TELEPHONE (OUTPATIENT)
Dept: GASTROENTEROLOGY | Facility: AMBULARY SURGERY CENTER | Age: 24
End: 2019-06-19

## 2019-07-18 ENCOUNTER — OFFICE VISIT (OUTPATIENT)
Dept: GASTROENTEROLOGY | Facility: CLINIC | Age: 24
End: 2019-07-18
Payer: COMMERCIAL

## 2019-07-18 VITALS
DIASTOLIC BLOOD PRESSURE: 62 MMHG | HEIGHT: 63 IN | BODY MASS INDEX: 20.73 KG/M2 | RESPIRATION RATE: 16 BRPM | HEART RATE: 90 BPM | TEMPERATURE: 98.2 F | WEIGHT: 117 LBS | SYSTOLIC BLOOD PRESSURE: 106 MMHG

## 2019-07-18 DIAGNOSIS — R14.0 BLOATING: ICD-10-CM

## 2019-07-18 DIAGNOSIS — R10.13 EPIGASTRIC PAIN: Primary | ICD-10-CM

## 2019-07-18 DIAGNOSIS — K21.9 GASTROESOPHAGEAL REFLUX DISEASE WITHOUT ESOPHAGITIS: ICD-10-CM

## 2019-07-18 DIAGNOSIS — R11.0 NAUSEA: ICD-10-CM

## 2019-07-18 DIAGNOSIS — K29.30 SUPERFICIAL GASTRITIS WITHOUT HEMORRHAGE, UNSPECIFIED CHRONICITY: ICD-10-CM

## 2019-07-18 PROCEDURE — 99214 OFFICE O/P EST MOD 30 MIN: CPT | Performed by: PHYSICIAN ASSISTANT

## 2019-07-18 RX ORDER — SUCRALFATE ORAL 1 G/10ML
1 SUSPENSION ORAL
Qty: 840 ML | Refills: 3 | Status: SHIPPED | OUTPATIENT
Start: 2019-07-18 | End: 2022-06-20

## 2019-07-18 NOTE — PROGRESS NOTES
[facility] - Outpatient Follow-up Note  Eric Flores 21 y o  female MRN: [id number]  Encounter: [id number]    ASSESSMENT AND PLAN:      Epigastric pain  -generally post prandial with associated nausea and bloating  -EGD 11/2018 showed mild gastritis, she has not seen improvement on a PPI  -celiac testing was negative  -will prescribe carafate  -also obtain cbc, cmp, lipase  -rule out biliary etiology with ultrasound, she did have some nonspecific gallbladder thickening in the past on U/S, if U/S is negative consider HIDA  -would not recommend repeat EGD at this time     Irregular bowel habits  -she generally is constipated but this alternates with looser stools  -suspect IBS, which may be influencing symptoms above  -she will start fiber supplement, this has helped her in the past    ______________________________________________________________________    SUBJECTIVE:  Patient presents for follow up  She has been followed for epigastric pain, generally worse post prandially, when she awakens she feels well, bloating throughout the day, globus sensation  She has intermittent nausea, no vomiting  She has a lack of appetite but forces herself to eat, she has not lost weight  She denies NSAID use, EGD with Dr Edgar Magaña 11/2018 which showed mild gastritis, otherwise was unremarkable  She had celiac testing and biopsies which were negative  Though she feels as if she may have gluten sensitivity so she generally eats gluten free  She tends to be constipated but this alternates with diarrhea  No blood in her stool  REVIEW OF SYSTEMS IS OTHERWISE NEGATIVE  Historical Information   Past Medical History:   Diagnosis Date    Anxiety     Migraine      Past Surgical History:   Procedure Laterality Date    FRACTURE SURGERY      right ankle     KNEE ARTHROSCOPY      [place] ESOPHAGOGASTRODUODENOSCOPY TRANSORAL DIAGNOSTIC N/A 11/1/2018    Procedure: ESOPHAGOGASTRODUODENOSCOPY (EGD);   Surgeon: Carlton Edouard MD;  Location: [facility]; Service: Gastroenterology    WISDOM TOOTH EXTRACTION Bilateral     uooers and lowers      Social History   Social History     Substance and Sexual Activity   Alcohol Use Yes    Comment: rare     Social History     Substance and Sexual Activity   Drug Use No     Social History     Tobacco Use   Smoking Status Never Smoker   Smokeless Tobacco Never Used     Family History   Problem Relation Age of Onset    Cancer Maternal Grandmother     Diabetes Maternal Grandfather     Gout Paternal Grandfather        Meds/Allergies       Current Outpatient Medications:     fluticasone (FLONASE) 50 mcg/act nasal spray    norgestimate-ethinyl estradiol (ORTHO TRI-CYCLEN LO) 0 18/0 215/0 25 MG-25 MCG per tablet    pantoprazole (PROTONIX) 40 mg tablet    ranitidine (ZANTAC) 150 MG capsule    ALPRAZolam (XANAX) 0 25 mg tablet    butalbital-aspirin-caffeine-codeine (FIORINAL WITH CODEINE) -73-30 mg per capsule    ondansetron (ZOFRAN) 4 mg tablet    sucralfate (CARAFATE) 1 g/10 mL suspension    Allergies   Allergen Reactions    Trazamine [Trazodone & Diet Manage Prod]            Objective     Blood pressure 106/62, pulse 90, temperature 98 2 °F (36 8 °C), temperature source Tympanic, resp  rate 16, height 5' 3" (1 6 m), weight 53 1 kg (117 lb), not currently breastfeeding  Body mass index is 20 73 kg/m²  PHYSICAL EXAM:      General Appearance:   Alert, cooperative, no distress   HEENT:   Normocephalic, atraumatic, anicteric      Neck:  Supple, symmetrical, trachea midline   Lungs:   Clear to auscultation bilaterally; no rales, rhonchi or wheezing; respirations unlabored    Heart[de-identified]   Regular rate and rhythm; no murmur, rub, or gallop     Abdomen:   Soft, non-tender, non-distended; normal bowel sounds; no masses, no organomegaly    Genitalia:   Deferred    Rectal:   Deferred    Extremities:  No cyanosis, clubbing or edema    Pulses:  2+ and symmetric    Skin:  No jaundice, rashes, or lesions    Lymph nodes:  No palpable cervical lymphadenopathy        Lab Results:   No visits with results within 1 Day(s) from this visit  Latest known visit with results is:   Admission on 11/01/2018, Discharged on 11/01/2018   Component Date Value    EXT Preg Test, Ur 11/01/2018 Negative     Case Report 11/01/2018                      Value:Surgical Pathology Report                         Case: [id number]                                   Authorizing Provider:  Joe Smith MD            Collected:           11/01/2018 1107              Ordering Location:     [facility]   Received:            11/01/2018 [place] [id number] [facility]                                                                       Pathologist:           Vika Ybarra MD                                                         Specimens:   A) - Duodenum, Duodenal Bx, r/o Celiac                                                              B) - Stomach, Bx Stomach, Gastritis, r/o h  pylori                                         Final Diagnosis 11/01/2018                      Value: This result contains rich text formatting which cannot be displayed here   Additional Information 11/01/2018                      Value: This result contains rich text formatting which cannot be displayed here  [facility] Gross Description 11/01/2018                      Value: This result contains rich text formatting which cannot be displayed here  Radiology Results:   No results found

## 2019-07-20 LAB
ALBUMIN SERPL-MCNC: 4.7 G/DL (ref 3.5–5.5)
ALBUMIN/GLOB SERPL: 1.9 {RATIO} (ref 1.2–2.2)
ALP SERPL-CCNC: 45 IU/L (ref 39–117)
ALT SERPL-CCNC: 11 IU/L (ref 0–32)
AST SERPL-CCNC: 17 IU/L (ref 0–40)
BASOPHILS # BLD AUTO: 0 X10E3/UL (ref 0–0.2)
BASOPHILS NFR BLD AUTO: 0 %
BILIRUB SERPL-MCNC: 0.2 MG/DL (ref 0–1.2)
BUN SERPL-MCNC: 12 MG/DL (ref 6–20)
BUN/CREAT SERPL: 12 (ref 9–23)
CALCIUM SERPL-MCNC: 10.1 MG/DL (ref 8.7–10.2)
CHLORIDE SERPL-SCNC: 101 MMOL/L (ref 96–106)
CO2 SERPL-SCNC: 22 MMOL/L (ref 20–29)
CREAT SERPL-MCNC: 1 MG/DL (ref 0.57–1)
EOSINOPHIL # BLD AUTO: 0.1 X10E3/UL (ref 0–0.4)
EOSINOPHIL NFR BLD AUTO: 1 %
ERYTHROCYTE [DISTWIDTH] IN BLOOD BY AUTOMATED COUNT: 14.2 % (ref 12.3–15.4)
GLOBULIN SER-MCNC: 2.5 G/DL (ref 1.5–4.5)
GLUCOSE SERPL-MCNC: 104 MG/DL (ref 65–99)
HCT VFR BLD AUTO: 37.2 % (ref 34–46.6)
HGB BLD-MCNC: 12.7 G/DL (ref 11.1–15.9)
IMM GRANULOCYTES # BLD: 0 X10E3/UL (ref 0–0.1)
IMM GRANULOCYTES NFR BLD: 0 %
LIPASE SERPL-CCNC: 44 U/L (ref 14–72)
LYMPHOCYTES # BLD AUTO: 1.5 X10E3/UL (ref 0.7–3.1)
LYMPHOCYTES NFR BLD AUTO: 25 %
MCH RBC QN AUTO: 29.7 PG (ref 26.6–33)
MCHC RBC AUTO-ENTMCNC: 34.1 G/DL (ref 31.5–35.7)
MCV RBC AUTO: 87 FL (ref 79–97)
MONOCYTES # BLD AUTO: 0.4 X10E3/UL (ref 0.1–0.9)
MONOCYTES NFR BLD AUTO: 6 %
NEUTROPHILS # BLD AUTO: 4.1 X10E3/UL (ref 1.4–7)
NEUTROPHILS NFR BLD AUTO: 68 %
PLATELET # BLD AUTO: 292 X10E3/UL (ref 150–450)
POTASSIUM SERPL-SCNC: 4.2 MMOL/L (ref 3.5–5.2)
PROT SERPL-MCNC: 7.2 G/DL (ref 6–8.5)
RBC # BLD AUTO: 4.27 X10E6/UL (ref 3.77–5.28)
SL AMB EGFR AFRICAN AMERICAN: 90 ML/MIN/1.73
SL AMB EGFR NON AFRICAN AMERICAN: 78 ML/MIN/1.73
SODIUM SERPL-SCNC: 139 MMOL/L (ref 134–144)
WBC # BLD AUTO: 6.1 X10E3/UL (ref 3.4–10.8)

## 2019-07-22 ENCOUNTER — TELEPHONE (OUTPATIENT)
Dept: GASTROENTEROLOGY | Facility: CLINIC | Age: 24
End: 2019-07-22

## 2019-07-22 NOTE — LETTER
July 22, 2019     Kelsey Hanna  [address] [place] [address]      Dear Ms Alas: We have attempted to reach you regarding your results  We ask that you please contact our office upon receipt of this letter to receive your results  Thank you in advance for your cooperation and assistance          Sincerely,   [facility] Staff  [phone]

## 2019-07-22 NOTE — TELEPHONE ENCOUNTER
----- Message from Franca Scott PA-C sent at 7/22/2019  1:49 PM EDT -----  Please let patient know her labs are unremarkable, we will follow up ultrasound results when available

## 2019-07-23 ENCOUNTER — HOSPITAL ENCOUNTER (OUTPATIENT)
Dept: RADIOLOGY | Facility: HOSPITAL | Age: 24
Discharge: HOME | End: 2019-07-23
Payer: COMMERCIAL

## 2019-07-23 DIAGNOSIS — R10.13 EPIGASTRIC PAIN: ICD-10-CM

## 2019-07-23 PROCEDURE — 76705 ECHO EXAM OF ABDOMEN: CPT

## 2019-07-24 ENCOUNTER — TELEPHONE (OUTPATIENT)
Dept: GASTROENTEROLOGY | Facility: CLINIC | Age: 24
End: 2019-07-24

## 2019-07-24 DIAGNOSIS — R10.11 POSTPRANDIAL ABDOMINAL PAIN IN RIGHT UPPER QUADRANT: Primary | ICD-10-CM

## 2019-07-24 NOTE — TELEPHONE ENCOUNTER
----- Message from Marylu Poole PA-C sent at 7/24/2019  1:38 PM EDT -----  Please inform patient that her ultrasound is normal  Per tabatha's note from her recent office visit, plan for HIDA scan to rule out an biliary dysfunction  I have ordered this

## 2019-07-30 ENCOUNTER — TELEPHONE (OUTPATIENT)
Dept: GASTROENTEROLOGY | Facility: CLINIC | Age: 24
End: 2019-07-30

## 2019-07-30 NOTE — TELEPHONE ENCOUNTER
----- Message from Spencer Ruiz sent at 7/29/2019  3:19 PM EDT -----  Regarding: FW: AUTHORIZATION      ----- Message -----  From: Jami Hollowayd  Sent: 7/29/2019   1:19 PM EDT  To: [facility] [place]  Subject: AUTHORIZATION                                    Hi:    According to Dinae Amador from "[employer]Mickey is required:    Cedrick Chavis  1995  [facility]   [id number]  [address] [place] [address]  [id number]  DX: R10 11 (ICD-10-CM) - Postprandial abdominal pain in right upper quadrant  Ordered by:Alla Hewitt  DOS:  07/31/19 at [employer],  [facility]

## 2019-07-30 NOTE — TELEPHONE ENCOUNTER
Called juana [phone], teresa Herrera with janelle byers  Clinical review required  Faxed clinicals to [phone]  Marked urgent

## 2019-07-31 ENCOUNTER — TRANSCRIBE ORDERS (OUTPATIENT)
Dept: ADMINISTRATIVE | Facility: HOSPITAL | Age: 24
End: 2019-07-31

## 2019-07-31 ENCOUNTER — HOSPITAL ENCOUNTER (OUTPATIENT)
Dept: RADIOLOGY | Facility: HOSPITAL | Age: 24
Discharge: HOME | End: 2019-07-31
Payer: COMMERCIAL

## 2019-07-31 VITALS — BODY MASS INDEX: 20.73 KG/M2 | WEIGHT: 117 LBS

## 2019-07-31 DIAGNOSIS — R10.11 POSTPRANDIAL ABDOMINAL PAIN IN RIGHT UPPER QUADRANT: ICD-10-CM

## 2019-07-31 PROCEDURE — 78227 HEPATOBIL SYST IMAGE W/DRUG: CPT

## 2019-07-31 PROCEDURE — A9537 TC99M MEBROFENIN: HCPCS

## 2019-08-01 ENCOUNTER — TELEPHONE (OUTPATIENT)
Dept: GASTROENTEROLOGY | Facility: CLINIC | Age: 24
End: 2019-08-01

## 2019-08-01 NOTE — TELEPHONE ENCOUNTER
21 yr old patient was seen in office 7/18 for epigastric pain  Celiac negative, nuclear biliary study and US unremarkable  EGD showed mild gastritis  Suspect IBS  She is reporting persistent abdominal pain/bloating and constipation alternating with diarrhea despite avoiding gluten, spicy food and taking fiber supplement  She denies lactose/dairy as a trigger  I reviewed carafate as needed one hr prior to meals, PPI 30 min prior to breakfast and zantac HS if needed  She has appointment on 9/12, do you think bentyl would help in the meantime? Please provide recommendation, thank you

## 2019-08-02 DIAGNOSIS — K29.70 GASTRITIS WITHOUT BLEEDING, UNSPECIFIED CHRONICITY, UNSPECIFIED GASTRITIS TYPE: ICD-10-CM

## 2019-08-02 DIAGNOSIS — K58.2 IRRITABLE BOWEL SYNDROME WITH BOTH CONSTIPATION AND DIARRHEA: Primary | ICD-10-CM

## 2019-08-02 RX ORDER — RANITIDINE 150 MG/1
150 CAPSULE ORAL EVERY EVENING
Qty: 30 CAPSULE | Refills: 5 | Status: SHIPPED | OUTPATIENT
Start: 2019-08-02 | End: 2019-09-03 | Stop reason: SDUPTHER

## 2019-08-02 RX ORDER — DICYCLOMINE HYDROCHLORIDE 10 MG/1
10 CAPSULE ORAL 3 TIMES DAILY PRN
Qty: 90 CAPSULE | Refills: 1 | Status: SHIPPED | OUTPATIENT
Start: 2019-08-02

## 2019-08-02 RX ORDER — PANTOPRAZOLE SODIUM 40 MG/1
40 TABLET, DELAYED RELEASE ORAL DAILY
Qty: 30 TABLET | Refills: 11 | Status: SHIPPED | OUTPATIENT
Start: 2019-08-02 | End: 2019-09-10

## 2019-08-02 NOTE — TELEPHONE ENCOUNTER
When I went to order bentyl and levsin, they both gave me a contraindication due to her allergy to trazamine  Can we ask her about this?  There is no specific allergy listed

## 2019-08-02 NOTE — TELEPHONE ENCOUNTER
I will order the bentyl, I appears the zantac she has been taking has the same warning so she should be fine with the bentyl

## 2019-08-02 NOTE — TELEPHONE ENCOUNTER
Patient just wanted bentyl, are you able to put that through? I did leave her a message to call me so I can clarify her allergies  Thanks!

## 2019-08-02 NOTE — TELEPHONE ENCOUNTER
Yes would recommend using bentyl as needed up to TID before meals  Make sure patient is aware that bentyl can cause constipation if taken in large amounts  She can use OTC miralax to constipation up to twice daily if needed

## 2019-09-03 DIAGNOSIS — K29.70 GASTRITIS WITHOUT BLEEDING, UNSPECIFIED CHRONICITY, UNSPECIFIED GASTRITIS TYPE: ICD-10-CM

## 2019-09-03 RX ORDER — RANITIDINE 150 MG/1
150 CAPSULE ORAL EVERY EVENING
Qty: 30 CAPSULE | Refills: 0 | Status: SHIPPED | OUTPATIENT
Start: 2019-09-03 | End: 2019-12-03 | Stop reason: SDUPTHER

## 2019-09-10 ENCOUNTER — OFFICE VISIT (OUTPATIENT)
Dept: GASTROENTEROLOGY | Facility: CLINIC | Age: 24
End: 2019-09-10
Payer: COMMERCIAL

## 2019-09-10 VITALS
HEART RATE: 75 BPM | BODY MASS INDEX: 21.33 KG/M2 | HEIGHT: 63 IN | TEMPERATURE: 99 F | SYSTOLIC BLOOD PRESSURE: 98 MMHG | WEIGHT: 120.4 LBS | RESPIRATION RATE: 18 BRPM | DIASTOLIC BLOOD PRESSURE: 60 MMHG

## 2019-09-10 DIAGNOSIS — R11.0 NAUSEA: Primary | ICD-10-CM

## 2019-09-10 PROBLEM — R79.89 ELEVATED LFTS: Status: RESOLVED | Noted: 2018-04-30 | Resolved: 2019-09-10

## 2019-09-10 PROCEDURE — 99213 OFFICE O/P EST LOW 20 MIN: CPT | Performed by: INTERNAL MEDICINE

## 2019-09-10 RX ORDER — ONDANSETRON 4 MG/1
4 TABLET, ORALLY DISINTEGRATING ORAL EVERY 6 HOURS PRN
Qty: 60 TABLET | Refills: 2 | Status: SHIPPED | OUTPATIENT
Start: 2019-09-10

## 2019-09-10 NOTE — PATIENT INSTRUCTIONS
Stop pantoprazole  Take zantac twice daily  Try laura Lopez (time release supplement)  Take dicyclomine three time daily

## 2019-09-10 NOTE — LETTER
September 10, 2019     Sis Bright MD  [address] [place] [address]    Patient: Azael Ragsdale   YOB: 1995   Date of Visit: 9/10/2019       Dear Dr Sheree Aggarwal:    Thank you for referring Azael Ragsdale to me for evaluation  Below are my notes for this consultation  If you have questions, please do not hesitate to call me  I look forward to following your patient along with you  Sincerely,        Christie Frye MD        CC: No Recipients  Christie Frye MD  9/10/2019 11:04 PM  Sign at close encounter  [facility]  Azael Ragsdale 21 y o  female MRN: [id number]            Assessment & Plan:    Pleasant 26-year-old female who is had intermittent persistent nausea, abdominal discomfort and irregular stools  1   Nausea:  Unclear etiology, had recent ultrasound and HIDA scan were unremarkable  -suggested that we can try stopping PPI therapy as this may be a rare side effect of PPI therapy  -patient was given prescription for Zofran for symptomatic management  -we will check a gastric emptying scan, she may have developed a post infectious gastroenteritis after her original presentation and hospital stay  -endoscopic evaluation in the past has been relatively unremarkable  -continue to monitor precipitating foods    2  Abdominal pain:  Responds well to dicyclomine, likely secondary to IBS or GI spasm  -continue dicyclomine  -continue fiber supplementation  -at this time no strong indication for colonoscopy    3  Irregular stools: This is patient is at her baseline, continue fiber supplementation    Will have the patient follow up in a few months, will re-evaluate and consider further evaluation at that time  _____________________________________________________________        CC:  Nausea, bloating    HPI:  Azael Ragsdale is a 21 y  o female who is here for nausea, bloating and abdominal pain    As you know this is a pleasant 26-year-old female, initially seen in the hospital she presented with nausea, vomiting LFTs which were elevated, much of which had improved, she had been placed on PPI therapy and notes that she has had since then continued intermittent nausea abdominal bloating and discomfort  Irregular stools a bowel movement every other day  She was last seen in the office recommended fiber supplementation which she notes has not significantly helped with bowel function  She does take dicyclomine which does seem to help with her generalized epigastric abdominal pain and discomfort  She has been on PPI therapy and H2 blockers without any improvement in her nausea  Patient's weight has been stable  She denies any fevers or chills  She reports that her anxiety has generally improved  He takes dicyclomine twice daily  She takes about 3 tablets of fiber supplements daily, she notes that the fiber powder is were difficult for her to take and be consistent with  She will notes occasional mucus in her stools  Her appetite is generally improved  ROS:  The remainder of the ROS was negative except for the pertinent positives mentioned in HPI        Allergies: Trazamine [trazodone & diet manage prod]    Medications:   Current Outpatient Medications:     ALPRAZolam (XANAX) 0 25 mg tablet, Take 0 25 mg by mouth daily at bedtime as needed for anxiety, Disp: , Rfl:     butalbital-aspirin-caffeine-codeine (FIORINAL WITH CODEINE) -79-30 mg per capsule, Take 1 capsule by mouth every 6 (six) hours as needed for headaches, migraine, mild pain or moderate pain, Disp: , Rfl:     dicyclomine (BENTYL) 10 mg capsule, Take 1 capsule (10 mg total) by mouth 3 (three) times a day as needed (abdominal cramping, diarrhea), Disp: 90 capsule, Rfl: 1    fluticasone (FLONASE) 50 mcg/act nasal spray, 1 spray 2 (two) times a day, Disp: , Rfl: 0    norgestimate-ethinyl estradiol (ORTHO TRI-CYCLEN LO) 0 18/0 215/0 25 MG-25 MCG per tablet, Take 1 tablet by mouth daily, Disp: , Rfl:     ranitidine (ZANTAC) 150 MG capsule, Take 1 capsule (150 mg total) by mouth every evening, Disp: 30 capsule, Rfl: 0    sucralfate (CARAFATE) 1 g/10 mL suspension, Take 10 mL (1 g total) by mouth 3 (three) times a day before meals, Disp: 840 mL, Rfl: 3    ondansetron (ZOFRAN-ODT) 4 mg disintegrating tablet, Take 1 tablet (4 mg total) by mouth every 6 (six) hours as needed for nausea or vomiting, Disp: 60 tablet, Rfl: 2    Past Medical History:   Diagnosis Date    Anxiety     Migraine        Past Surgical History:   Procedure Laterality Date    FRACTURE SURGERY      right ankle     KNEE ARTHROSCOPY      [place] ESOPHAGOGASTRODUODENOSCOPY TRANSORAL DIAGNOSTIC N/A 11/1/2018    Procedure: ESOPHAGOGASTRODUODENOSCOPY (EGD); Surgeon: Teresa Islas MD;  Location: [facility]; Service: Gastroenterology    WISDOM TOOTH EXTRACTION Bilateral     uooers and lowers        Family History   Problem Relation Age of Onset    Cancer Maternal Grandmother     Diabetes Maternal Grandfather     Gout Paternal Grandfather         reports that she has never smoked  She has never used smokeless tobacco  She reports that she drinks alcohol  She reports that she does not use drugs        Physical Exam:    BP 98/60 (BP Location: Right arm, Patient Position: Sitting, Cuff Size: Standard)   Pulse 75   Temp 99 °F (37 2 °C)   Resp 18   Ht 5' 3" (1 6 m)   Wt 54 6 kg (120 lb 6 4 oz)   BMI 21 33 kg/m²      Gen: wn/wd, NAD, healthy-appearing female  HEENT: anicteric, MMM, no cervical LAD  CVS: RRR, no m/r/g  CHEST: CTA b/l  ABD: +BS, soft, NT,ND, no hepatosplenomegaly  EXT: no c/c/e  NEURO: aaox3  SKIN: NO rashes

## 2019-09-11 NOTE — PROGRESS NOTES
[facility]  Scott Torres 21 y o  female MRN: [id number]            Assessment & Plan:    Pleasant 29-year-old female who is had intermittent persistent nausea, abdominal discomfort and irregular stools  1   Nausea:  Unclear etiology, had recent ultrasound and HIDA scan were unremarkable  -suggested that we can try stopping PPI therapy as this may be a rare side effect of PPI therapy  -patient was given prescription for Zofran for symptomatic management  -we will check a gastric emptying scan, she may have developed a post infectious gastroenteritis after her original presentation and hospital stay  -endoscopic evaluation in the past has been relatively unremarkable  -continue to monitor precipitating foods    2  Abdominal pain:  Responds well to dicyclomine, likely secondary to IBS or GI spasm  -continue dicyclomine  -continue fiber supplementation  -at this time no strong indication for colonoscopy    3  Irregular stools: This is patient is at her baseline, continue fiber supplementation    Will have the patient follow up in a few months, will re-evaluate and consider further evaluation at that time  _____________________________________________________________        CC:  Nausea, bloating    HPI:  Scott Torres is a 21 y  o female who is here for nausea, bloating and abdominal pain  As you know this is a pleasant 29-year-old female, initially seen in the hospital she presented with nausea, vomiting LFTs which were elevated, much of which had improved, she had been placed on PPI therapy and notes that she has had since then continued intermittent nausea abdominal bloating and discomfort  Irregular stools a bowel movement every other day  She was last seen in the office recommended fiber supplementation which she notes has not significantly helped with bowel function    She does take dicyclomine which does seem to help with her generalized epigastric abdominal pain and discomfort  She has been on PPI therapy and H2 blockers without any improvement in her nausea  Patient's weight has been stable  She denies any fevers or chills  She reports that her anxiety has generally improved  He takes dicyclomine twice daily  She takes about 3 tablets of fiber supplements daily, she notes that the fiber powder is were difficult for her to take and be consistent with  She will notes occasional mucus in her stools  Her appetite is generally improved  ROS:  The remainder of the ROS was negative except for the pertinent positives mentioned in HPI        Allergies: Trazamine [trazodone & diet manage prod]    Medications:   Current Outpatient Medications:     ALPRAZolam (XANAX) 0 25 mg tablet, Take 0 25 mg by mouth daily at bedtime as needed for anxiety, Disp: , Rfl:     butalbital-aspirin-caffeine-codeine (FIORINAL WITH CODEINE) -87-30 mg per capsule, Take 1 capsule by mouth every 6 (six) hours as needed for headaches, migraine, mild pain or moderate pain, Disp: , Rfl:     dicyclomine (BENTYL) 10 mg capsule, Take 1 capsule (10 mg total) by mouth 3 (three) times a day as needed (abdominal cramping, diarrhea), Disp: 90 capsule, Rfl: 1    fluticasone (FLONASE) 50 mcg/act nasal spray, 1 spray 2 (two) times a day, Disp: , Rfl: 0    norgestimate-ethinyl estradiol (ORTHO TRI-CYCLEN LO) 0 18/0 215/0 25 MG-25 MCG per tablet, Take 1 tablet by mouth daily, Disp: , Rfl:     ranitidine (ZANTAC) 150 MG capsule, Take 1 capsule (150 mg total) by mouth every evening, Disp: 30 capsule, Rfl: 0    sucralfate (CARAFATE) 1 g/10 mL suspension, Take 10 mL (1 g total) by mouth 3 (three) times a day before meals, Disp: 840 mL, Rfl: 3    ondansetron (ZOFRAN-ODT) 4 mg disintegrating tablet, Take 1 tablet (4 mg total) by mouth every 6 (six) hours as needed for nausea or vomiting, Disp: 60 tablet, Rfl: 2    Past Medical History:   Diagnosis Date    Anxiety     Migraine        Past Surgical History:   Procedure Laterality Date    FRACTURE SURGERY      right ankle     KNEE ARTHROSCOPY      [place] ESOPHAGOGASTRODUODENOSCOPY TRANSORAL DIAGNOSTIC N/A 11/1/2018    Procedure: ESOPHAGOGASTRODUODENOSCOPY (EGD); Surgeon: Terence Capone MD;  Location: [facility]; Service: Gastroenterology    WISDOM TOOTH EXTRACTION Bilateral     uooers and lowers        Family History   Problem Relation Age of Onset    Cancer Maternal Grandmother     Diabetes Maternal Grandfather     Gout Paternal Grandfather         reports that she has never smoked  She has never used smokeless tobacco  She reports that she drinks alcohol  She reports that she does not use drugs        Physical Exam:    BP 98/60 (BP Location: Right arm, Patient Position: Sitting, Cuff Size: Standard)   Pulse 75   Temp 99 °F (37 2 °C)   Resp 18   Ht 5' 3" (1 6 m)   Wt 54 6 kg (120 lb 6 4 oz)   BMI 21 33 kg/m²     Gen: wn/wd, NAD, healthy-appearing female  HEENT: anicteric, MMM, no cervical LAD  CVS: RRR, no m/r/g  CHEST: CTA b/l  ABD: +BS, soft, NT,ND, no hepatosplenomegaly  EXT: no c/c/e  NEURO: aaox3  SKIN: NO rashes

## 2019-09-17 ENCOUNTER — TELEPHONE (OUTPATIENT)
Dept: GASTROENTEROLOGY | Facility: AMBULARY SURGERY CENTER | Age: 24
End: 2019-09-17

## 2019-09-17 NOTE — TELEPHONE ENCOUNTER
----- Message from [facility] sent at 9/16/2019  2:17 PM EDT -----  Regarding: FW: An[facility] Rodriguez pt, [facility]  ----- Message -----  From: Eliud Gonzalez MA  Sent: 9/16/2019   1:50 PM EDT  To: Gastroenterology [address] [place] [address] Clinical  Subject: FW: Anu Graves                                    ----- Message -----  From: Sophie Riddle  Sent: 9/16/2019   1:40 PM EDT  To: Gastroenterology [facility] Clinical  Subject: AUTHORIZATION                                    Good afternoon:    Authorization request:    Mary Araiza  1995  [facility] [employer]    [id number]  NM GASTRIC EMPTYING  [address]  DX: R11 0 (ICD-10-CM) - Nausea  Ordered by: Greg Salmon  DOS: 9/20/19 at [facility]      Thanks,  [employer]

## 2019-09-17 NOTE — TELEPHONE ENCOUNTER
Spoke with Marge [facility]  For [id number]  Case currently in clinical review  [facility]#[phone]  Faxed all clinicals to [phone]

## 2019-09-20 ENCOUNTER — HOSPITAL ENCOUNTER (OUTPATIENT)
Dept: RADIOLOGY | Facility: HOSPITAL | Age: 24
Discharge: HOME | End: 2019-09-20
Attending: INTERNAL MEDICINE
Payer: COMMERCIAL

## 2019-09-20 DIAGNOSIS — R11.0 NAUSEA: ICD-10-CM

## 2019-09-20 PROCEDURE — 78264 GASTRIC EMPTYING IMG STUDY: CPT

## 2019-09-20 PROCEDURE — A9541 TC99M SULFUR COLLOID: HCPCS

## 2019-09-23 ENCOUNTER — TELEPHONE (OUTPATIENT)
Dept: GASTROENTEROLOGY | Facility: AMBULARY SURGERY CENTER | Age: 24
End: 2019-09-23

## 2019-09-23 NOTE — TELEPHONE ENCOUNTER
----- Message from Berna Ruiz MD sent at 9/23/2019  1:10 PM EDT -----  Good news, please inform the patient that her emptying scan was completely normal which is great  Please find out if her nausea has improved at all since she stop taking the pantoprazole  Please find out if the Zofran helps  She can follow up in the office with me or 1 of the PAs in 3 months time, please have her call with any questions or concerns or any worsening symptoms

## 2019-09-23 NOTE — TELEPHONE ENCOUNTER
Spoke to patient informed her that her emptying scan was completely normal which is great  Shes wondering what else can it be, Liver? Pancreas? Patient states Zofran helps Temporarily   ,     patient is scheduled for follow up with Samaria Wheeler in December

## 2019-09-24 NOTE — TELEPHONE ENCOUNTER
I spoke with the patient  She says a lot of her symptoms have to do with feeling like there is a balloon filling up inside me after eating; suspect there may be a dietary intolerance at play  She has never tried lactose restriction or any specific diets before  I advised her to try avoiding dairy products for a couple of weeks and see if this improves her symptoms, failing this we can try the FODMAP diet  She requested that we send her information about the FODMAP diet; please send handout to patient  Otherwise she will followup for her appointment in December and will call in the meantime with any questions    Thank you

## 2019-10-13 ENCOUNTER — OFFICE VISIT (OUTPATIENT)
Dept: URGENT CARE | Facility: CLINIC | Age: 24
End: 2019-10-13
Payer: COMMERCIAL

## 2019-10-13 VITALS
BODY MASS INDEX: 21.09 KG/M2 | WEIGHT: 119 LBS | HEART RATE: 83 BPM | RESPIRATION RATE: 15 BRPM | HEIGHT: 63 IN | DIASTOLIC BLOOD PRESSURE: 68 MMHG | TEMPERATURE: 98.8 F | SYSTOLIC BLOOD PRESSURE: 110 MMHG | OXYGEN SATURATION: 99 %

## 2019-10-13 DIAGNOSIS — L23.7 ALLERGIC CONTACT DERMATITIS DUE TO PLANTS, EXCEPT FOOD: Primary | ICD-10-CM

## 2019-10-13 PROCEDURE — 99213 OFFICE O/P EST LOW 20 MIN: CPT | Performed by: PHYSICIAN ASSISTANT

## 2019-10-13 RX ORDER — PREDNISONE 10 MG/1
TABLET ORAL
Qty: 30 TABLET | Refills: 0 | Status: SHIPPED | OUTPATIENT
Start: 2019-10-13 | End: 2022-06-20

## 2019-10-13 NOTE — PATIENT INSTRUCTIONS
Take the steroid as directed with food and water  While on this medication do not take any NSAIDs including ibuprofen (Advil), naproxen (Aleve), etc   Avoid caffeinated beverages while taking this medication  Begin an antihistamine such as Claritin or Benadryl  Apply ice to rash areas for itch and swelling relief  Keep your skin clean washing with soap and water and pat dry  Seek care if rash becomes red or hot, or with any fevers, chills, sweats, or streaking redness  Follow up with your family doctor in 3-5 days if symptoms persist   Proceed to the ER if symptoms worsen

## 2019-10-13 NOTE — PROGRESS NOTES
[facility]        NAME: Yvonne Friedman is a 21 y o  female  : 1995    MRN: [id number]  DATE: 2019  TIME: 1:25 PM    Assessment and Plan   Allergic contact dermatitis due to plants, except food [L23 7]  1  Allergic contact dermatitis due to plants, except food  predniSONE 10 mg tablet     Patient Instructions     Take the steroid as directed with food and water  While on this medication do not take any NSAIDs including ibuprofen (Advil), naproxen (Aleve), etc   Avoid caffeinated beverages while taking this medication  Begin an antihistamine such as Claritin or Benadryl  Apply ice to rash areas for itch and swelling relief  Keep your skin clean washing with soap and water and pat dry  Seek care if rash becomes red or hot, or with any fevers, chills, sweats, or streaking redness  Follow up with your family doctor in 3-5 days if symptoms persist   Proceed to the ER if symptoms worsen  Discussed with pt that s/sx are most consistent with contact dermatitis, likely from unknown plant exposure  Will cover with oral steroid due to widespread nature of rash  Tx plan reviewed  All questions answered  Precautions given  Chief Complaint     Chief Complaint   Patient presents with   [facility]     Pt reports of possible poison ivy exposure  S/S started approx 1 week ago     History of Present Illness       22 y/o female presenting with c/o blistering rash x 1 week  Reports rash started as small red pimples, present on the left arm at onset  These bumps then turned into blisters, that occasionally rupture, draining clear yellow fluid  Rash has now spread to the left chest, right arm, and b/l LE  Rash is itchy but not painful  She reports her  and son had some type of poison rash a few weeks ago after the pulled mary from around their house and weeded  Pt notes the last time she may have been exposed was just prior to symptom onset  No treatments tried      Review of Systems Review of Systems   Constitutional: Negative for chills, diaphoresis and fever  Respiratory: Negative for cough  Gastrointestinal: Negative for abdominal pain, diarrhea, nausea and vomiting  Musculoskeletal: Negative for arthralgias and myalgias  Skin: Positive for rash  Neurological: Negative for light-headedness and headaches       Current Medications       Current Outpatient Medications:     ALPRAZolam (XANAX) 0 25 mg tablet, Take 0 25 mg by mouth daily at bedtime as needed for anxiety, Disp: , Rfl:     butalbital-aspirin-caffeine-codeine (FIORINAL WITH CODEINE) -39-30 mg per capsule, Take 1 capsule by mouth every 6 (six) hours as needed for headaches, migraine, mild pain or moderate pain, Disp: , Rfl:     dicyclomine (BENTYL) 10 mg capsule, Take 1 capsule (10 mg total) by mouth 3 (three) times a day as needed (abdominal cramping, diarrhea), Disp: 90 capsule, Rfl: 1    fluticasone (FLONASE) 50 mcg/act nasal spray, 1 spray 2 (two) times a day, Disp: , Rfl: 0    norgestimate-ethinyl estradiol (ORTHO TRI-CYCLEN LO) 0 18/0 215/0 25 MG-25 MCG per tablet, Take 1 tablet by mouth daily, Disp: , Rfl:     ondansetron (ZOFRAN-ODT) 4 mg disintegrating tablet, Take 1 tablet (4 mg total) by mouth every 6 (six) hours as needed for nausea or vomiting, Disp: 60 tablet, Rfl: 2    predniSONE 10 mg tablet, Take 5 tablets days 1-2, 4 tablets days 3-4, 3 tablets days 5-6, 2 tablets days 7-8, 1 tablet days 9-10 , Disp: 30 tablet, Rfl: 0    ranitidine (ZANTAC) 150 MG capsule, Take 1 capsule (150 mg total) by mouth every evening, Disp: 30 capsule, Rfl: 0    sucralfate (CARAFATE) 1 g/10 mL suspension, Take 10 mL (1 g total) by mouth 3 (three) times a day before meals, Disp: 840 mL, Rfl: 3    Current Allergies     Allergies as of 10/13/2019 - Reviewed 10/13/2019   Allergen Reaction Noted    Lincoln Richardson [trazodone & diet manage prod] Itching 04/30/2018            The following portions of the patient's history were reviewed and updated as appropriate: allergies, current medications, past family history, past medical history, past social history, past surgical history and problem list      Past Medical History:   Diagnosis Date    Anxiety     Migraine        Past Surgical History:   Procedure Laterality Date    FRACTURE SURGERY      right ankle     KNEE ARTHROSCOPY      [place] ESOPHAGOGASTRODUODENOSCOPY TRANSORAL DIAGNOSTIC N/A 11/1/2018    Procedure: ESOPHAGOGASTRODUODENOSCOPY (EGD); Surgeon: Harman Francis MD;  Location: [facility] GI [facility]; Service: Gastroenterology    WISDOM TOOTH EXTRACTION Bilateral     uooers and lowers        Family History   Problem Relation Age of Onset    Cancer Maternal Grandmother     Diabetes Maternal Grandfather     Gout Paternal Grandfather      Medications have been verified  Objective   /68   Pulse 83   Temp 98 8 °F (37 1 °C)   Resp 15   Ht 5' 3" (1 6 m)   Wt 54 kg (119 lb)   SpO2 99%   BMI 21 08 kg/m²     Physical Exam     Physical Exam   Constitutional: She is oriented to person, place, and time  Vital signs are normal  She appears well-developed and well-nourished  She is cooperative  She does not appear ill  No distress  HENT:   Head: Normocephalic and atraumatic  Eyes: Conjunctivae and lids are normal  Right eye exhibits no chemosis, no discharge and no exudate  Left eye exhibits no chemosis, no discharge and no exudate  Right conjunctiva is not injected  Left conjunctiva is not injected  Cardiovascular: Normal rate, regular rhythm and normal heart sounds  Exam reveals no gallop, no distant heart sounds and no friction rub  No murmur heard  Pulmonary/Chest: Effort normal and breath sounds normal  No stridor  No respiratory distress  She has no decreased breath sounds  She has no wheezes  She has no rhonchi  She has no rales  Neurological: She is alert and oriented to person, place, and time  She has normal strength  She is not disoriented   No cranial nerve deficit  She exhibits normal muscle tone  Coordination and gait normal    Skin: Skin is warm, dry and intact  Rash noted  She is not diaphoretic  No erythema  No pallor  Erythematous papules and serous containing vesicles on erythematous to flesh colored base scattered across b/l upper and lower extremities and the superior chest  Lesions are 2 mm to 2 cm in diameter  Pt does appear to be excoriating some areas  No signs of secondary infection  Rash crosses multiple dermatomes  Psychiatric: She has a normal mood and affect  Her behavior is normal  Judgment and thought content normal    Nursing note and vitals reviewed

## 2019-12-03 DIAGNOSIS — K29.70 GASTRITIS WITHOUT BLEEDING, UNSPECIFIED CHRONICITY, UNSPECIFIED GASTRITIS TYPE: ICD-10-CM

## 2019-12-03 RX ORDER — RANITIDINE 150 MG/1
150 CAPSULE ORAL EVERY EVENING
Qty: 30 CAPSULE | Refills: 5 | Status: SHIPPED | OUTPATIENT
Start: 2019-12-03 | End: 2022-06-20

## 2019-12-17 ENCOUNTER — HOSPITAL ENCOUNTER (EMERGENCY)
Facility: HOSPITAL | Age: 24
Discharge: HOME | End: 2019-12-17
Attending: EMERGENCY MEDICINE
Payer: COMMERCIAL

## 2019-12-17 VITALS
RESPIRATION RATE: 16 BRPM | DIASTOLIC BLOOD PRESSURE: 76 MMHG | TEMPERATURE: 98.4 F | SYSTOLIC BLOOD PRESSURE: 138 MMHG | OXYGEN SATURATION: 100 % | HEART RATE: 85 BPM

## 2019-12-17 DIAGNOSIS — S61.419A HAND LACERATION: Primary | ICD-10-CM

## 2019-12-17 PROCEDURE — 90715 TDAP VACCINE 7 YRS/> IM: CPT | Performed by: EMERGENCY MEDICINE

## 2019-12-17 PROCEDURE — 90471 IMMUNIZATION ADMIN: CPT

## 2019-12-17 PROCEDURE — 99284 EMERGENCY DEPT VISIT MOD MDM: CPT

## 2019-12-17 RX ORDER — LIDOCAINE HYDROCHLORIDE AND EPINEPHRINE 10; 10 MG/ML; UG/ML
5 INJECTION, SOLUTION INFILTRATION; PERINEURAL ONCE
Status: COMPLETED | OUTPATIENT
Start: 2019-12-17 | End: 2019-12-17

## 2019-12-17 RX ADMIN — LIDOCAINE HYDROCHLORIDE,EPINEPHRINE BITARTRATE 5 ML: 10; .01 INJECTION, SOLUTION INFILTRATION; PERINEURAL at 20:26

## 2019-12-17 RX ADMIN — TETANUS TOXOID, REDUCED DIPHTHERIA TOXOID AND ACELLULAR PERTUSSIS VACCINE, ADSORBED 0.5 ML: 5; 2.5; 8; 8; 2.5 SUSPENSION INTRAMUSCULAR at 20:26

## 2019-12-18 NOTE — ED NOTES
Called the pharmacy to inquire about LET gel not being in acudose  No LET gel in the hospital  Dr Ariana Hays made aware       Wynetta Goldberg, RN  12/17/19 2017

## 2019-12-18 NOTE — DISCHARGE INSTRUCTIONS
Clean gently, daily, pat dry  You can use neosporin ointment on the stitches but not the glue  Keep covered and protected  Suture removal in 7-10 days

## 2019-12-18 NOTE — ED PROVIDER NOTES
History  Chief Complaint   Patient presents with    Hand Laceration     pt presents to the ed with a hand to the left hand  pt states she was "angry, and had and accident" in the kitchen  pt denies wanting to hurt her self and denies being suicidal      22 yo female c/o lacs to back of left hand  She cut herself with a kitchen knife because she was very upset  She denies that she was trying to kill herself  She doesn't know why she did it  She says she cheated and lied to her  and feels angry at herself  She does not have h/o suicide or cutting  She c/o pain at the lac site but non-radiating and no associated symptoms  She is unsure of last tetanus  She does feel safe going home and  is at bedside  History provided by:  Patient   used: No        Prior to Admission Medications   Prescriptions Last Dose Informant Patient Reported? Taking? ALPRAZolam (XANAX) 0 25 mg tablet   Yes No   Sig: Take 0 25 mg by mouth daily at bedtime as needed for anxiety   butalbital-aspirin-caffeine-codeine (FIORINAL WITH CODEINE) -55-30 mg per capsule   Yes No   Sig: Take 1 capsule by mouth every 6 (six) hours as needed for headaches, migraine, mild pain or moderate pain   dicyclomine (BENTYL) 10 mg capsule   No No   Sig: Take 1 capsule (10 mg total) by mouth 3 (three) times a day as needed (abdominal cramping, diarrhea)   fluticasone (FLONASE) 50 mcg/act nasal spray   Yes No   Si spray 2 (two) times a day   norgestimate-ethinyl estradiol (ORTHO TRI-CYCLEN LO) 0 18/0 215/0 25 MG-25 MCG per tablet  Self Yes No   Sig: Take 1 tablet by mouth daily   ondansetron (ZOFRAN-ODT) 4 mg disintegrating tablet   No No   Sig: Take 1 tablet (4 mg total) by mouth every 6 (six) hours as needed for nausea or vomiting   predniSONE 10 mg tablet   No No   Sig: Take 5 tablets days 1-2, 4 tablets days 3-4, 3 tablets days 5-6, 2 tablets days 7-8, 1 tablet days 9-10     ranitidine (ZANTAC) 150 MG capsule No No   Sig: Take 1 capsule (150 mg total) by mouth every evening   sucralfate (CARAFATE) 1 g/10 mL suspension   No No   Sig: Take 10 mL (1 g total) by mouth 3 (three) times a day before meals      Facility-Administered Medications: None       Past Medical History:   Diagnosis Date    Anxiety     Migraine        Past Surgical History:   Procedure Laterality Date    FRACTURE SURGERY      right ankle     KNEE ARTHROSCOPY      MI ESOPHAGOGASTRODUODENOSCOPY TRANSORAL DIAGNOSTIC N/A 11/1/2018    Procedure: ESOPHAGOGASTRODUODENOSCOPY (EGD); Surgeon: Nuria Bateman MD;  Location: [facility] GI LAB; Service: Gastroenterology    WISDOM TOOTH EXTRACTION Bilateral     uooers and lowers        Family History   Problem Relation Age of Onset    Cancer Maternal Grandmother     Diabetes Maternal Grandfather     Gout Paternal Grandfather      I have reviewed and agree with the history as documented  Social History     Tobacco Use    Smoking status: Never Smoker    Smokeless tobacco: Never Used   Substance Use Topics    Alcohol use: Yes     Comment: rare    Drug use: No        Review of Systems    Physical Exam  Physical Exam   Constitutional: She is oriented to person, place, and time  She appears well-developed and well-nourished  No distress  HENT:   Head: Normocephalic and atraumatic  Pulmonary/Chest: Effort normal    Musculoskeletal: Normal range of motion  She exhibits no edema, tenderness or deformity  Neurological: She is alert and oriented to person, place, and time  Skin: Skin is warm  Capillary refill takes less than 2 seconds  She is not diaphoretic  Dorsum of left hand/wrist - one superficial scratch proximal, another superficial lac about 1cm, and a third deeper but still relatively superficial lac 2cm  No FB  Distal m/s intact, radial pulse intact  Psychiatric:   tearful   Nursing note and vitals reviewed        Vital Signs  ED Triage Vitals [12/17/19 1935]   Temperature Pulse Respirations Blood Pressure SpO2   98 4 °F (36 9 °C) 97 20 139/73 100 %      Temp Source Heart Rate Source Patient Position - Orthostatic VS BP Location FiO2 (%)   Tympanic Monitor -- -- --      Pain Score       --           Vitals:    12/17/19 1935   BP: 139/73   Pulse: 97         Visual Acuity      ED Medications  Medications   tetanus-diphtheria-acellular pertussis (BOOSTRIX) IM injection 0 5 mL (0 5 mL Intramuscular Given 12/17/19 2026)   lidocaine-epinephrine (XYLOCAINE/EPINEPHRINE) 1 %-1:100,000 injection 5 mL (5 mL Infiltration Given 12/17/19 2026)       Diagnostic Studies  Results Reviewed     None                 No orders to display              Procedures  Laceration repair  Date/Time: 12/17/2019 8:39 PM  Performed by: Tung Shaffer MD  Authorized by: Tung Shaffer MD   Consent: Verbal consent obtained  Consent given by: patient  Body area: upper extremity  Location details: left hand  Laceration length: 2 cm  Anesthesia: local infiltration    Anesthesia:  Local Anesthetic: lidocaine 1% with epinephrine    Sedation:  Patient sedated: no      Wound Dehiscence:  Superficial Wound Dehiscence: simple closure      Procedure Details:  Preparation: Patient was prepped and draped in the usual sterile fashion  Amount of cleaning: standard  Skin closure: 5-0 nylon  Number of sutures: 4  Technique: simple  Approximation: close  Approximation difficulty: simple               ED Course                               MDM  Number of Diagnoses or Management Options  Hand laceration:   Diagnosis management comments: Pt  Advised of wound care, follow up for suture removal in 7-10 days or sooner if any problems  Lawrence from Crisis spoke with pt  And  and they were given options for outpt  Counseling          Disposition  Final diagnoses:   Hand laceration     Time reflects when diagnosis was documented in both MDM as applicable and the Disposition within this note     Time User Action Codes Description Comment    12/17/2019  8:37 PM Chanel Mehta Add [O49 002W] Hand laceration       ED Disposition     ED Disposition Condition Date/Time Comment    Discharge Stable Tue Dec 17, 2019  8:37 PM Molly Becerra discharge to home/self care  Follow-up Information    None         Patient's Medications   Discharge Prescriptions    No medications on file     No discharge procedures on file      ED Provider  Electronically Signed by           Almaz Licea MD  22/35/78 4771

## 2019-12-18 NOTE — ED NOTES
22 yo MWF presented to ER due to making 3 superficial lacerations to the back of her L-hand; the ER Attending reported the patient did this due to being caught cheating on her   The patient denies any SI and is accepting of a referral (Clinical Psychotherapy and Consultation)  ER Attending was updated

## 2019-12-18 NOTE — ED NOTES
Senait Rivas, crisis, in to give referral to pt for psychotherapy  Pt denied SI to Lawrence in front of this nurse       Rc Gandhi RN  12/17/19 2030

## 2020-03-06 ENCOUNTER — OFFICE VISIT (OUTPATIENT)
Dept: URGENT CARE | Facility: CLINIC | Age: 25
End: 2020-03-06
Payer: COMMERCIAL

## 2020-03-06 VITALS
WEIGHT: 122 LBS | TEMPERATURE: 100.1 F | HEART RATE: 103 BPM | HEIGHT: 63 IN | BODY MASS INDEX: 21.62 KG/M2 | RESPIRATION RATE: 18 BRPM | OXYGEN SATURATION: 100 % | SYSTOLIC BLOOD PRESSURE: 122 MMHG | DIASTOLIC BLOOD PRESSURE: 76 MMHG

## 2020-03-06 DIAGNOSIS — J06.9 URI WITH COUGH AND CONGESTION: Primary | ICD-10-CM

## 2020-03-06 DIAGNOSIS — J02.9 ACUTE PHARYNGITIS, UNSPECIFIED ETIOLOGY: ICD-10-CM

## 2020-03-06 LAB — S PYO AG THROAT QL: NEGATIVE

## 2020-03-06 PROCEDURE — 99213 OFFICE O/P EST LOW 20 MIN: CPT | Performed by: PHYSICIAN ASSISTANT

## 2020-03-06 PROCEDURE — 87880 STREP A ASSAY W/OPTIC: CPT | Performed by: PHYSICIAN ASSISTANT

## 2020-03-06 RX ORDER — BENZONATATE 200 MG/1
200 CAPSULE ORAL 3 TIMES DAILY PRN
Qty: 20 CAPSULE | Refills: 0 | Status: SHIPPED | OUTPATIENT
Start: 2020-03-06 | End: 2022-06-20

## 2020-03-06 NOTE — PATIENT INSTRUCTIONS
Begin Mucinex DM for cough and congestion relief  Take Tessalon as directed for cough  Nasal saline spray or Neti pot to rinse the nasal passages  Saltwater gargles, warm tea with honey, throat lozenges, and Chloraseptic spray for throat discomfort  Use a cool mist humidifier bedtime  Steam showers may also help to reduce coughing fits  Follow up with family doctor in 3-5 days  Proceed to the ER if symptoms worsen

## 2020-03-06 NOTE — PROGRESS NOTES
[facility]        NAME: Sanjeev Min is a 21 y o  female  : 1995    MRN: [id number]  DATE: 2020  TIME: 1:03 PM    Assessment and Plan   URI with cough and congestion [J06 9]  1  URI with cough and congestion  benzonatate (TESSALON) 200 MG capsule   2  Acute pharyngitis, unspecified etiology  POCT rapid strepA     Patient Instructions     Begin Mucinex DM for cough and congestion relief  Take Tessalon as directed for cough  Nasal saline spray or Neti pot to rinse the nasal passages  Saltwater gargles, warm tea with honey, throat lozenges, and Chloraseptic spray for throat discomfort  Use a cool mist humidifier bedtime  Steam showers may also help to reduce coughing fits  Follow up with family doctor in 3-5 days  Proceed to the ER if symptoms worsen  Chief Complaint     Chief Complaint   Patient presents with    Fever     Pt reports of sore throat with cough and fever with chest congestion    Sore Throat     History of Present Illness       43-year-old female with complaint of sore throat x 3-4 days  She reports symptoms of fatigue, HA, low-grade fever, nasal congestion, runny nose, bilateral ears clogged, and cough  Reports an intermittently productive cough that is persistent and causing upper abdominal aching discomfort  She has attempted treating with Dayquil and ibuprofen with some relief  No sick contacts or recent travel  Nonsmoker  No flu shot  Review of Systems   Review of Systems   Constitutional: Positive for fatigue and fever  Negative for chills and diaphoresis  HENT: Positive for congestion, ear pain, rhinorrhea and sore throat  Respiratory: Positive for cough  Negative for shortness of breath and wheezing  Gastrointestinal: Positive for abdominal pain  Negative for diarrhea, nausea and vomiting  Musculoskeletal: Negative for myalgias  Skin: Negative for rash  Neurological: Positive for headaches  Negative for dizziness       Current Medications       Current Outpatient Medications:     ALPRAZolam (XANAX) 0 25 mg tablet, Take 0 25 mg by mouth daily at bedtime as needed for anxiety, Disp: , Rfl:     benzonatate (TESSALON) 200 MG capsule, Take 1 capsule (200 mg total) by mouth 3 (three) times a day as needed for cough, Disp: 20 capsule, Rfl: 0    butalbital-aspirin-caffeine-codeine (FIORINAL WITH CODEINE) -18-30 mg per capsule, Take 1 capsule by mouth every 6 (six) hours as needed for headaches, migraine, mild pain or moderate pain, Disp: , Rfl:     dicyclomine (BENTYL) 10 mg capsule, Take 1 capsule (10 mg total) by mouth 3 (three) times a day as needed (abdominal cramping, diarrhea), Disp: 90 capsule, Rfl: 1    fluticasone (FLONASE) 50 mcg/act nasal spray, 1 spray 2 (two) times a day, Disp: , Rfl: 0    norgestimate-ethinyl estradiol (ORTHO TRI-CYCLEN LO) 0 18/0 215/0 25 MG-25 MCG per tablet, Take 1 tablet by mouth daily, Disp: , Rfl:     ondansetron (ZOFRAN-ODT) 4 mg disintegrating tablet, Take 1 tablet (4 mg total) by mouth every 6 (six) hours as needed for nausea or vomiting, Disp: 60 tablet, Rfl: 2    predniSONE 10 mg tablet, Take 5 tablets days 1-2, 4 tablets days 3-4, 3 tablets days 5-6, 2 tablets days 7-8, 1 tablet days 9-10 , Disp: 30 tablet, Rfl: 0    ranitidine (ZANTAC) 150 MG capsule, Take 1 capsule (150 mg total) by mouth every evening, Disp: 30 capsule, Rfl: 5    sucralfate (CARAFATE) 1 g/10 mL suspension, Take 10 mL (1 g total) by mouth 3 (three) times a day before meals, Disp: 840 mL, Rfl: 3    Current Allergies     Allergies as of 03/06/2020 - Reviewed 03/06/2020   Allergen Reaction Noted    Trazamine [trazodone & diet manage prod] Itching 04/30/2018            The following portions of the patient's history were reviewed and updated as appropriate: allergies, current medications, past family history, past medical history, past social history, past surgical history and problem list      Past Medical History: Diagnosis Date    Anxiety     Migraine        Past Surgical History:   Procedure Laterality Date    FRACTURE SURGERY      right ankle     KNEE ARTHROSCOPY      AL ESOPHAGOGASTRODUODENOSCOPY TRANSORAL DIAGNOSTIC N/A 11/1/2018    Procedure: ESOPHAGOGASTRODUODENOSCOPY (EGD); Surgeon: Kaleigh Rushing MD;  Location: [facility] GI LAB; Service: Gastroenterology    WISDOM TOOTH EXTRACTION Bilateral     uooers and lowers        Family History   Problem Relation Age of Onset    Cancer Maternal Grandmother     Diabetes Maternal Grandfather     Gout Paternal Grandfather      Medications have been verified  Objective   /76   Pulse 103   Temp 100 1 °F (37 8 °C)   Resp 18   Ht 5' 3" (1 6 m)   Wt 55 3 kg (122 lb)   SpO2 100%   BMI 21 61 kg/m²      Rapid strep:  Negative  Physical Exam     Physical Exam   Constitutional: Vital signs are normal  She appears well-developed and well-nourished  She is cooperative  She appears ill  No distress  HENT:   Head: Normocephalic and atraumatic  Right Ear: Hearing, tympanic membrane, external ear and ear canal normal    Left Ear: Hearing, tympanic membrane, external ear and ear canal normal    Nose: Mucosal edema and rhinorrhea present  Mouth/Throat: Uvula is midline and mucous membranes are normal  Mucous membranes are not pale, not dry and not cyanotic  No oral lesions  No uvula swelling  Posterior oropharyngeal erythema (mild) present  No oropharyngeal exudate, posterior oropharyngeal edema or tonsillar abscesses  Eyes: Conjunctivae and lids are normal  Right eye exhibits no discharge and no exudate  Left eye exhibits no discharge and no exudate  Neck: Trachea normal and phonation normal  Neck supple  No tracheal tenderness present  No neck rigidity  No edema and no erythema present  Cardiovascular: Normal rate, regular rhythm and normal heart sounds  Exam reveals no distant heart sounds  Pulmonary/Chest: Effort normal and breath sounds normal  No stridor  No respiratory distress  She has no decreased breath sounds  She has no wheezes  She has no rhonchi  She has no rales  Abdominal: Soft  Bowel sounds are normal  She exhibits no distension and no mass  There is tenderness (mild) in the epigastric area  There is no rigidity, no rebound and no guarding  Lymphadenopathy:     She has no cervical adenopathy  Neurological: She is alert  She is not disoriented  No cranial nerve deficit  Coordination and gait normal    Skin: Skin is warm, dry and intact  No rash noted  She is not diaphoretic  No erythema  No pallor  Psychiatric: She has a normal mood and affect  Her behavior is normal  Judgment and thought content normal    Nursing note and vitals reviewed

## 2022-06-20 ENCOUNTER — OFFICE VISIT (OUTPATIENT)
Dept: URGENT CARE | Facility: CLINIC | Age: 27
End: 2022-06-20

## 2022-06-20 VITALS
DIASTOLIC BLOOD PRESSURE: 80 MMHG | TEMPERATURE: 96.4 F | RESPIRATION RATE: 18 BRPM | OXYGEN SATURATION: 100 % | SYSTOLIC BLOOD PRESSURE: 110 MMHG | HEART RATE: 86 BPM | HEIGHT: 64 IN | WEIGHT: 125 LBS | BODY MASS INDEX: 21.34 KG/M2

## 2022-06-20 DIAGNOSIS — L23.7 POISON IVY DERMATITIS: ICD-10-CM

## 2022-06-20 DIAGNOSIS — J01.10 ACUTE NON-RECURRENT FRONTAL SINUSITIS: Primary | ICD-10-CM

## 2022-06-20 RX ORDER — AMOXICILLIN AND CLAVULANATE POTASSIUM 875; 125 MG/1; MG/1
1 TABLET, FILM COATED ORAL EVERY 12 HOURS SCHEDULED
Qty: 14 TABLET | Refills: 0 | Status: SHIPPED | OUTPATIENT
Start: 2022-06-20 | End: 2022-06-27

## 2022-06-20 RX ORDER — PREDNISONE 10 MG/1
50 TABLET ORAL DAILY
Qty: 25 TABLET | Refills: 0 | Status: SHIPPED | OUTPATIENT
Start: 2022-06-20 | End: 2022-06-25

## 2022-06-20 NOTE — PROGRESS NOTES
[address]        NAME: Stephen Booker is a 28 y o  female  : 1995    MRN: [id number]  DATE: 2022  TIME: 6:05 PM    Assessment and Plan   Acute non-recurrent frontal sinusitis [J01 10]  1  Acute non-recurrent frontal sinusitis  amoxicillin-clavulanate (AUGMENTIN) 875-125 mg per tablet   2  Poison ivy dermatitis  predniSONE 10 mg tablet         Patient Instructions     Start Augmentin for frontal sinusitis  Start Prednisone for poison ivy dermatitis  Follow up with PCP in 2-3 days  Proceed to ER if symptoms worsen  Chief Complaint     Chief Complaint   Patient presents with   Claudis Ortiz Like Symptoms    Poison Ivy     X 6 days - URI with nasal congestion/PND, facial pressure into ears  No fever and minimal cough  Did 2 rapid COVID  - negative (last yesterday)  Using Severe Sinus and Flonase  Also has poison ivy on arms and spreading  Using Calamine and Iverest spray  History of Present Illness     28 y o  F presents with complaints of sinus pressure/pain, facial pain, congestion, PND and fever x 7 days  Denies CP, SOB, N/V/D  States fever has resolved  Taking tylenol severe sinus and fluticasone OTC with no relief  Denies sick contacts  Works in healthcare  No tobacco use  Denies recent travel  Also states she has poison ivy that is continuing to spread  Using Calamine and [address] with no relief  Started on R arm and has spread to neck, trunk, and lower extremities  Denies trouble breathing or swallowing  Denies CP or SOB  Review of Systems   Review of Systems   Constitutional: Positive for fever  Negative for chills and fatigue  HENT: Positive for congestion, postnasal drip, sinus pressure and sinus pain  Negative for ear pain, facial swelling, hearing loss, rhinorrhea, sneezing, sore throat and trouble swallowing  Eyes: Negative for pain, redness and visual disturbance  Respiratory: Negative for cough, chest tightness, shortness of breath and wheezing  Cardiovascular: Negative for chest pain and palpitations  Gastrointestinal: Negative for abdominal pain, diarrhea, nausea and vomiting  Genitourinary: Negative for dysuria, flank pain, hematuria and pelvic pain  Musculoskeletal: Negative for arthralgias, back pain and myalgias  Skin: Negative for color change and rash  Neurological: Negative for dizziness, seizures, syncope, weakness, light-headedness, numbness and headaches  Psychiatric/Behavioral: Negative for confusion, hallucinations and sleep disturbance  The patient is not nervous/anxious  All other systems reviewed and are negative          Current Medications       Current Outpatient Medications:     ALPRAZolam (XANAX) 0 25 mg tablet, Take 0 25 mg by mouth daily at bedtime as needed for anxiety, Disp: , Rfl:     amoxicillin-clavulanate (AUGMENTIN) 875-125 mg per tablet, Take 1 tablet by mouth every 12 (twelve) hours for 7 days, Disp: 14 tablet, Rfl: 0    butalbital-aspirin-caffeine-codeine (FIORINAL WITH CODEINE) -92-30 mg per capsule, Take 1 capsule by mouth every 6 (six) hours as needed for headaches, migraine, mild pain or moderate pain, Disp: , Rfl:     dicyclomine (BENTYL) 10 mg capsule, Take 1 capsule (10 mg total) by mouth 3 (three) times a day as needed (abdominal cramping, diarrhea), Disp: 90 capsule, Rfl: 1    fluticasone (FLONASE) 50 mcg/act nasal spray, 1 spray 2 (two) times a day, Disp: , Rfl: 0    norgestimate-ethinyl estradiol (ORTHO TRI-CYCLEN LO) 0 18/0 215/0 25 MG-25 MCG per tablet, Take 1 tablet by mouth daily, Disp: , Rfl:     ondansetron (ZOFRAN-ODT) 4 mg disintegrating tablet, Take 1 tablet (4 mg total) by mouth every 6 (six) hours as needed for nausea or vomiting, Disp: 60 tablet, Rfl: 2    predniSONE 10 mg tablet, Take 5 tablets (50 mg total) by mouth daily for 5 days, Disp: 25 tablet, Rfl: 0    Current Allergies     Allergies as of 06/20/2022 - Reviewed 06/20/2022   Allergen Reaction Noted    Trazamine [trazodone & diet manage prod] Itching 04/30/2018            The following portions of the patient's history were reviewed and updated as appropriate: allergies, current medications, past family history, past medical history, past social history, past surgical history and problem list      Past Medical History:   Diagnosis Date    Anxiety     Migraine        Past Surgical History:   Procedure Laterality Date    FRACTURE SURGERY      right ankle     KNEE ARTHROSCOPY      ND ESOPHAGOGASTRODUODENOSCOPY TRANSORAL DIAGNOSTIC N/A 11/1/2018    Procedure: ESOPHAGOGASTRODUODENOSCOPY (EGD); Surgeon: Jasson Munoz MD;  Location[facility]; Service: Gastroenterology    WISDOM TOOTH EXTRACTION Bilateral     uooers and lowers        Family History   Problem Relation Age of Onset    Cancer Maternal Grandmother     Diabetes Maternal Grandfather     Gout Paternal Grandfather          Medications have been verified  Objective   /80   Pulse 86   Temp (!) 96 4 °F (35 8 °C)   Resp 18   Ht 5' 4" (1 626 m)   Wt 56 7 kg (125 lb)   LMP 06/04/2022 (Exact Date)   SpO2 100%   BMI 21 46 kg/m²   Patient's last menstrual period was 06/04/2022 (exact date)  Physical Exam     Physical Exam  Vitals reviewed  Constitutional:       General: She is not in acute distress  Appearance: Normal appearance  She is not toxic-appearing  HENT:      Head: Normocephalic  Right Ear: Tympanic membrane normal  Tympanic membrane is not erythematous or bulging  Left Ear: Tympanic membrane normal  Tympanic membrane is not erythematous or bulging  Nose: No congestion or rhinorrhea  Right Sinus: Frontal sinus tenderness present  No maxillary sinus tenderness  Left Sinus: Frontal sinus tenderness present  No maxillary sinus tenderness  Mouth/Throat:      Pharynx: Oropharynx is clear  Uvula midline  Posterior oropharyngeal erythema present  No oropharyngeal exudate or uvula swelling  Tonsils: No tonsillar exudate or tonsillar abscesses  Eyes:      Extraocular Movements: Extraocular movements intact  Conjunctiva/sclera: Conjunctivae normal       Pupils: Pupils are equal, round, and reactive to light  Cardiovascular:      Rate and Rhythm: Normal rate and regular rhythm  Pulses: Normal pulses  Heart sounds: Normal heart sounds  Pulmonary:      Effort: Pulmonary effort is normal  No tachypnea, accessory muscle usage or respiratory distress  Breath sounds: Normal breath sounds and air entry  No decreased air movement  No decreased breath sounds, wheezing, rhonchi or rales  Abdominal:      General: Bowel sounds are normal       Palpations: Abdomen is soft  Tenderness: There is no abdominal tenderness  There is no right CVA tenderness or guarding  Musculoskeletal:         General: Normal range of motion  Cervical back: Normal range of motion  Lymphadenopathy:      Cervical: No cervical adenopathy  Skin:     General: Skin is warm and dry  Findings: Rash present  Rash is urticarial    Neurological:      General: No focal deficit present  Mental Status: She is alert  Cranial Nerves: Cranial nerves are intact  Sensory: Sensation is intact  Motor: Motor function is intact  Coordination: Coordination is intact  Gait: Gait is intact  Deep Tendon Reflexes: Reflexes are normal and symmetric

## 2022-06-20 NOTE — PATIENT INSTRUCTIONS
Sinusitis  Start Augmentin as directed for sinus infection  Start steroid for poison ivy dermatitis  Can continue OTC medications  Follow up with PCP in 2-3 days  Proceed to ER if symptoms worsen  Feel better! AMBULATORY CARE:   Sinusitis  is inflammation or infection of your sinuses  Sinusitis is most often caused by a virus  Acute sinusitis may last up to 12 weeks  Chronic sinusitis lasts longer than 12 weeks  Recurrent sinusitis means you have 4 or more infections in 1 year  Common signs and symptoms:   Fever    Pain, pressure, redness, or swelling around the forehead, cheeks, or eyes    Thick yellow or green discharge from your nose    Tenderness when you touch your face over your sinuses    Dry cough that happens mostly at night or when you lie down    Headache and face pain that is worse when you lean forward    Tooth pain, or pain when you chew    Seek care immediately if:   You have trouble breathing or wheezing that is getting worse  You have a stiff neck, a fever, or a bad headache  You cannot open your eye  Your eyeball bulges out or you cannot move your eye  You are more sleepy than normal, or you notice changes in your ability to think, move, or talk  You have swelling of your forehead or scalp  Call your doctor if:   You have vision changes, such as double vision  Your eye and eyelid are red, swollen, and painful  Your symptoms do not improve or go away after 10 days  You have nausea and are vomiting  Your nose is bleeding  You have questions or concerns about your condition or care  Medicines: Your symptoms may go away on their own  Your healthcare provider may recommend watchful waiting for up to 10 days before starting antibiotics  You may need any of the following:  Acetaminophen  decreases pain and fever  It is available without a doctor's order  Ask how much to take and how often to take it  Follow directions   Read the labels of all other medicines you are using to see if they also contain acetaminophen, or ask your doctor or pharmacist  Acetaminophen can cause liver damage if not taken correctly  Do not use more than 4 grams (4,000 milligrams) total of acetaminophen in one day  NSAIDs , such as ibuprofen, help decrease swelling, pain, and fever  This medicine is available with or without a doctor's order  NSAIDs can cause stomach bleeding or kidney problems in certain people  If you take blood thinner medicine, always ask your healthcare provider if NSAIDs are safe for you  Always read the medicine label and follow directions  Nasal steroid sprays  may help decrease inflammation in your nose and sinuses  Decongestants  help reduce swelling and drain mucus in the nose and sinuses  They may help you breathe easier  Antihistamines  help dry mucus in the nose and relieve sneezing  Antibiotics  help treat or prevent a bacterial infection  Self-care:   Rinse your sinuses as directed  Use a sinus rinse device to rinse your nasal passages with a saline (salt water) solution or distilled water  Do not use tap water  This will help thin the mucus in your nose and rinse away pollen and dirt  It will also help reduce swelling so you can breathe normally  Use a humidifier  to increase air moisture in your home  This may make it easier for you to breathe and help decrease your cough  Sleep with your head elevated  Place an extra pillow under your head before you go to sleep to help your sinuses drain  Drink liquids as directed  Ask your healthcare provider how much liquid to drink each day and which liquids are best for you  Liquids will thin the mucus in your nose and help it drain  Avoid drinks that contain alcohol or caffeine  Do not smoke, and avoid secondhand smoke  Nicotine and other chemicals in cigarettes and cigars can make your symptoms worse   Ask your healthcare provider for information if you currently smoke and need help to quit  E-cigarettes or smokeless tobacco still contain nicotine  Talk to your healthcare provider before you use these products  Prevent the spread of germs:   Wash your hands often with soap and water  Wash your hands after you use the bathroom, change a child's diaper, or sneeze  Wash your hands before you prepare or eat food  Stay away from people who are sick  Some germs spread easily and quickly through contact  Follow up with your doctor as directed: You may be referred to an ear, nose, and throat specialist  Write down your questions so you remember to ask them during your visits  © [employer] 2022 Information is for End User's use only and may not be sold, redistributed or otherwise used for commercial purposes  All illustrations and images included in CareNotes® are the copyrighted property of [employer]  or [facility] Cecilio James  The above information is an  only  It is not intended as medical advice for individual conditions or treatments  Talk to your doctor, nurse or pharmacist before following any medical regimen to see if it is safe and effective for you

## 2022-07-20 ENCOUNTER — TELEPHONE (OUTPATIENT)
Dept: NEUROLOGY | Facility: CLINIC | Age: 27
End: 2022-07-20

## 2022-07-20 NOTE — TELEPHONE ENCOUNTER
[facility] to confirm appt  Asked patient to arrive @ 10:15am for NP paperwork/check in   Gave central # to confirm/reschedule

## 2022-08-09 ENCOUNTER — OFFICE VISIT (OUTPATIENT)
Dept: URGENT CARE | Facility: CLINIC | Age: 27
End: 2022-08-09
Payer: COMMERCIAL

## 2022-08-09 VITALS
TEMPERATURE: 97 F | WEIGHT: 130 LBS | RESPIRATION RATE: 18 BRPM | HEART RATE: 77 BPM | BODY MASS INDEX: 22.2 KG/M2 | OXYGEN SATURATION: 98 % | HEIGHT: 64 IN

## 2022-08-09 DIAGNOSIS — R39.9 UTI SYMPTOMS: Primary | ICD-10-CM

## 2022-08-09 LAB
SL AMB  POCT GLUCOSE, UA: NORMAL
SL AMB LEUKOCYTE ESTERASE,UA: NORMAL
SL AMB POCT BILIRUBIN,UA: NORMAL
SL AMB POCT BLOOD,UA: NORMAL
SL AMB POCT CLARITY,UA: CLEAR
SL AMB POCT COLOR,UA: NORMAL
SL AMB POCT KETONES,UA: NORMAL
SL AMB POCT NITRITE,UA: NORMAL
SL AMB POCT PH,UA: 8
SL AMB POCT SPECIFIC GRAVITY,UA: 1.01
SL AMB POCT URINE PROTEIN: NORMAL
SL AMB POCT UROBILINOGEN: 0.2

## 2022-08-09 PROCEDURE — 81002 URINALYSIS NONAUTO W/O SCOPE: CPT | Performed by: PHYSICIAN ASSISTANT

## 2022-08-09 PROCEDURE — 99213 OFFICE O/P EST LOW 20 MIN: CPT | Performed by: PHYSICIAN ASSISTANT

## 2022-08-09 PROCEDURE — 87086 URINE CULTURE/COLONY COUNT: CPT | Performed by: PHYSICIAN ASSISTANT

## 2022-08-09 NOTE — PROGRESS NOTES
[facility]        NAME: Janay Mcfarland is a 28 y o  female  : 1995    MRN: [id number]  DATE: 2022  TIME: 5:41 PM    Assessment and Plan   UTI symptoms [R39 9]  1  UTI symptoms  Urine culture    POCT urine dip         Patient Instructions     Patient Instructions   Urine dip non suggestive of UTI, will send for culture, if warranted will call an antibiotic  Advised to continue to maintain adequate fluid hydration and avoid any excessive caffeine consumption  Follow up with PCP in 3-5 days  Proceed to  ER if symptoms worsen  Chief Complaint     Chief Complaint   Patient presents with    Possible UTI     Frequency, abdominal pain began yesterday          History of Present Illness       Patient is a 69-year-old female presenting today with UTI symptoms x1 day  Patient notes last night she was experiencing some increased frequency of urination, sensation of urgency and some abdominal cramping, believe she may have a UTI, has had 1 many years ago with similar presentation, has not taken any medications or alleviating factors for current symptoms  Notes LMP ended last week  Denies fever, chills, flank pain, hematuria, vaginal discharge or drainage  Review of Systems   Review of Systems   Constitutional: Negative for chills and fever  HENT: Negative for sore throat  Respiratory: Negative for cough  Cardiovascular: Negative for chest pain  Gastrointestinal: Positive for abdominal pain  Negative for diarrhea, nausea and vomiting  Genitourinary: Positive for frequency and urgency  Negative for dysuria, flank pain and hematuria  Musculoskeletal: Negative for arthralgias and myalgias  Skin: Negative for rash  Neurological: Negative for headaches           Current Medications       Current Outpatient Medications:     ALPRAZolam (XANAX) 0 25 mg tablet, Take 0 25 mg by mouth daily at bedtime as needed for anxiety, Disp: , Rfl:    butalbital-aspirin-caffeine-codeine (FIORINAL WITH CODEINE) -41-30 mg per capsule, Take 1 capsule by mouth every 6 (six) hours as needed for headaches, migraine, mild pain or moderate pain, Disp: , Rfl:     norgestimate-ethinyl estradiol (ORTHO TRI-CYCLEN LO) 0 18/0 215/0 25 MG-25 MCG per tablet, Take 1 tablet by mouth daily, Disp: , Rfl:     dicyclomine (BENTYL) 10 mg capsule, Take 1 capsule (10 mg total) by mouth 3 (three) times a day as needed (abdominal cramping, diarrhea) (Patient not taking: Reported on 8/9/2022), Disp: 90 capsule, Rfl: 1    fluticasone (FLONASE) 50 mcg/act nasal spray, 1 spray 2 (two) times a day (Patient not taking: Reported on 8/9/2022), Disp: , Rfl: 0    ondansetron (ZOFRAN-ODT) 4 mg disintegrating tablet, Take 1 tablet (4 mg total) by mouth every 6 (six) hours as needed for nausea or vomiting (Patient not taking: Reported on 8/9/2022), Disp: 60 tablet, Rfl: 2    Current Allergies     Allergies as of 08/09/2022 - Reviewed 08/09/2022   Allergen Reaction Noted    Trazamine [trazodone & diet manage prod] Itching 04/30/2018            The following portions of the patient's history were reviewed and updated as appropriate: allergies, current medications, past family history, past medical history, past social history, past surgical history and problem list      Past Medical History:   Diagnosis Date    Anxiety     Migraine        Past Surgical History:   Procedure Laterality Date    FRACTURE SURGERY      right ankle     KNEE ARTHROSCOPY      GA ESOPHAGOGASTRODUODENOSCOPY TRANSORAL DIAGNOSTIC N/A 11/1/2018    Procedure: ESOPHAGOGASTRODUODENOSCOPY (EGD); Surgeon: Tiana Ervin MD;  Location: [facility] GI LAB;   Service: Gastroenterology    WISDOM TOOTH EXTRACTION Bilateral     uooers and lowers        Family History   Problem Relation Age of Onset    Cancer Maternal Grandmother     Diabetes Maternal Grandfather     Gout Paternal Grandfather          Medications have been verified  Objective   Pulse 77   Temp (!) 97 °F (36 1 °C)   Resp 18   Ht 5' 4" (1 626 m)   Wt 59 kg (130 lb)   LMP 07/31/2022   SpO2 98%   BMI 22 31 kg/m²        Physical Exam     Physical Exam  Vitals reviewed  Constitutional:       General: She is not in acute distress  Appearance: Normal appearance  She is not ill-appearing  HENT:      Head: Normocephalic and atraumatic  Right Ear: External ear normal       Left Ear: External ear normal    Eyes:      Conjunctiva/sclera: Conjunctivae normal    Cardiovascular:      Rate and Rhythm: Normal rate and regular rhythm  Pulses: Normal pulses  Heart sounds: Normal heart sounds  Pulmonary:      Effort: Pulmonary effort is normal       Breath sounds: Normal breath sounds  Abdominal:      General: Abdomen is flat  Bowel sounds are normal       Palpations: Abdomen is soft  Tenderness: There is no abdominal tenderness  There is no right CVA tenderness or left CVA tenderness  Skin:     General: Skin is warm  Capillary Refill: Capillary refill takes less than 2 seconds  Neurological:      General: No focal deficit present  Mental Status: She is alert and oriented to person, place, and time

## 2022-08-09 NOTE — PATIENT INSTRUCTIONS
Urine dip non suggestive of UTI, will send for culture, if warranted will call an antibiotic  Advised to continue to maintain adequate fluid hydration and avoid any excessive caffeine consumption

## 2022-08-10 LAB — BACTERIA UR CULT: NORMAL

## 2022-08-12 ENCOUNTER — TELEPHONE (OUTPATIENT)
Dept: NEUROLOGY | Facility: CLINIC | Age: 27
End: 2022-08-12

## 2022-08-12 NOTE — TELEPHONE ENCOUNTER
Called to r/s patient and she said she is seeing a Neurologist in [place] and does not like him or feel he is helping her at all  She also said with her work schedule she would be able to get to [facility] with little issue rather then [place], [address] [facility]  Openings at [place] are rare, if at all, so I reached out to the [place] office   Will keep her on the wait list for [facility]

## 2022-08-12 NOTE — TELEPHONE ENCOUNTER
[facility]ed with [facility] requesting the patient contact the office to schedule a NP appointment

## 2022-08-15 ENCOUNTER — TELEPHONE (OUTPATIENT)
Dept: NEUROLOGY | Facility: CLINIC | Age: 27
End: 2022-08-15

## 2022-08-15 NOTE — TELEPHONE ENCOUNTER
[facility] requesting the patient contract the office to schedule a appointment

## 2022-08-15 NOTE — TELEPHONE ENCOUNTER
----- Message from Delmy Shay sent at 8/12/2022  8:14 AM EDT -----  Marianne Rubio morning Laurent Gatica, it's [facility] AISLINN Drummond MA  I have a new patient from [place] that would like to make an appt in your office for migraines  She needs a latest available appt perhaps you could give her a call and schedule her in any available appt you have  I spoke with her this morning and she said [place] to [address] [place] to [place]  I will keep her on my wait list for [facility] but if you could get her in earlier that would be great  Thanks for your help & I hope you are well

## 2022-08-17 ENCOUNTER — TELEPHONE (OUTPATIENT)
Dept: NEUROLOGY | Facility: CLINIC | Age: 27
End: 2022-08-17

## 2022-08-17 NOTE — TELEPHONE ENCOUNTER
[facility] requesting the patient contact the office to schedule a appointment    ----- Message from Mirta Cam sent at 8/12/2022  8:14 AM EDT -----  [facility] Froilan F[facility], it's [facility] AISLINN Rangel MA  I have a new patient from [place] that would like to make an appt in your office for migraines  She needs a latest available appt perhaps you could give her a call and schedule her in any available appt you have  I spoke with her this morning and she said [place] to [address] or [place] would be next to [place]  I will keep her on my wait list for Camron Lisa but if you could get her in earlier that would be great  Thanks for your help & I hope you are well

## 2022-08-17 NOTE — LETTER
[facility] [address] [address]  Phone#  [phone]  Fax#  [phone]      August 30, 2022      Rhonda Ludwig    We had been trying to contact you to schedule a new patient appointment  You have been referred to this office by your primary care physician Sherif Carolina MD   If you would still like to be seen by this specialty, please give us a call [phone]      Sincerely,  Ezio Press, DO

## 2022-08-18 NOTE — TELEPHONE ENCOUNTER
2nd attempt     [facility] requesting the patient contact the office to schedule a appointment

## 2022-08-30 NOTE — TELEPHONE ENCOUNTER
3 rd attempt   [facility] requesting the patient contact the office to schedule a NP appointment      Letter Sent

## 2024-04-12 NOTE — TELEPHONE ENCOUNTER
Called patient and left a detailed message about her script  Also told her if she has not had the labs done yet she should do so at her earliest convenience 
I returned call to patient in regard to message received regarding questions regarding a medication that was prescribed  I reached , left message to call back if clarification was still needed 
LMOM 
Patient called to ask if the pantoplrazole dose can be changed to once daily because the insurance will not cover twice daily
Please advise; I will send for another month of pantoprazole but if she is not having any significant acid reflux or indigestion issues, she can start to taper off the medication gradually  Also, she was seen last month and recommended to have lab work including liver function tests done; I do not see that these have been done yet  Please advise her to have these checked    Thank you
Pt returning a missed call from office  Pt stated she is experiencing bloating and need to know what is the pantoprazole for   Please assist thank you
done
greg patient      Requesting refill on pantoprazole
hair removal not indicated

## (undated) DEVICE — "MAJ-901 WATER CONTAINER SET CV-160/140": Brand: WATER CONTAINER

## (undated) DEVICE — BRUSH ENDO CLEANING DBL-HEADER

## (undated) DEVICE — TUBING BUBBLE CLEAR 5MM X 100 FT NS

## (undated) DEVICE — "MH-443 SUCTION VALVE F/EVIS140 EVIS160": Brand: SUCTION VALVE

## (undated) DEVICE — BITE BLOCK MAXI 60FR LF STRAP

## (undated) DEVICE — "MH-438 A/W VLVE F/140 EVIS-140": Brand: AIR/WATER VALVE

## (undated) DEVICE — LUBRICANT SURGILUBE TUBE 4 OZ  FLIP TOP

## (undated) DEVICE — AIRLIFE™  ADULT CUSHION NASAL CANNULA WITH 7 FOOT (2.1 M) CRUSH-RESISTANT OXYGEN TUBING, AND U/CONNECT-IT ADAPTER: Brand: AIRLIFE™

## (undated) DEVICE — MEDI-VAC YANKAUER SUCTION HANDLE: Brand: CARDINAL HEALTH

## (undated) DEVICE — 60 ML SYRINGE,REGULAR TIP: Brand: MONOJECT

## (undated) DEVICE — GAUZE SPONGES,16 PLY: Brand: CURITY

## (undated) DEVICE — "MB-142 MOUTHPIECE": Brand: MOUTHPIECE

## (undated) DEVICE — 1200CC GUARDIAN II: Brand: GUARDIAN

## (undated) DEVICE — GLOVE EXAM NON-STRL NTRL PLUS LRG PF

## (undated) DEVICE — DISPOSABLE BIOPSY VALVE MAJ-1555: Brand: SINGLE USE BIOPSY VALVE (STERILE)

## (undated) DEVICE — SOLIDIFIER FLUID WASTE CONTROL 1500ML

## (undated) DEVICE — TRAVELKIT CONTAINS FIRST STEP KIT (200ML EP-4 KIT) AND SOILED SCOPE BAG - 1 KIT: Brand: TRAVELKIT CONTAINS FIRST STEP KIT AND SOILED SCOPE BAG

## (undated) DEVICE — SINGLE-USE BIOPSY FORCEPS: Brand: RADIAL JAW 4